# Patient Record
Sex: FEMALE | URBAN - METROPOLITAN AREA
[De-identification: names, ages, dates, MRNs, and addresses within clinical notes are randomized per-mention and may not be internally consistent; named-entity substitution may affect disease eponyms.]

---

## 2019-09-12 ENCOUNTER — INPATIENT (INPATIENT)
Facility: HOSPITAL | Age: 64
LOS: 7 days | Discharge: PSYCHIATRIC OR SUBSTANCE USE FACILITY | DRG: 871 | End: 2019-09-20
Attending: INTERNAL MEDICINE | Admitting: HOSPITALIST
Payer: MEDICARE

## 2019-09-12 VITALS
SYSTOLIC BLOOD PRESSURE: 142 MMHG | OXYGEN SATURATION: 96 % | HEART RATE: 114 BPM | DIASTOLIC BLOOD PRESSURE: 85 MMHG | HEIGHT: 65 IN | TEMPERATURE: 100 F | WEIGHT: 145.06 LBS | RESPIRATION RATE: 16 BRPM

## 2019-09-12 DIAGNOSIS — J18.9 PNEUMONIA, UNSPECIFIED ORGANISM: ICD-10-CM

## 2019-09-12 DIAGNOSIS — F20.9 SCHIZOPHRENIA, UNSPECIFIED: ICD-10-CM

## 2019-09-12 DIAGNOSIS — E07.89 OTHER SPECIFIED DISORDERS OF THYROID: ICD-10-CM

## 2019-09-12 DIAGNOSIS — J18.1 LOBAR PNEUMONIA, UNSPECIFIED ORGANISM: ICD-10-CM

## 2019-09-12 LAB
ALBUMIN SERPL ELPH-MCNC: 4 G/DL — SIGNIFICANT CHANGE UP (ref 3.3–5.2)
ALP SERPL-CCNC: 83 U/L — SIGNIFICANT CHANGE UP (ref 40–120)
ALT FLD-CCNC: 26 U/L — SIGNIFICANT CHANGE UP
ANION GAP SERPL CALC-SCNC: 12 MMOL/L — SIGNIFICANT CHANGE UP (ref 5–17)
APTT BLD: 33.5 SEC — SIGNIFICANT CHANGE UP (ref 27.5–36.3)
AST SERPL-CCNC: 22 U/L — SIGNIFICANT CHANGE UP
BASOPHILS # BLD AUTO: 0.03 K/UL — SIGNIFICANT CHANGE UP (ref 0–0.2)
BASOPHILS NFR BLD AUTO: 0.4 % — SIGNIFICANT CHANGE UP (ref 0–2)
BILIRUB SERPL-MCNC: 0.4 MG/DL — SIGNIFICANT CHANGE UP (ref 0.4–2)
BUN SERPL-MCNC: 16 MG/DL — SIGNIFICANT CHANGE UP (ref 8–20)
CALCIUM SERPL-MCNC: 9.6 MG/DL — SIGNIFICANT CHANGE UP (ref 8.6–10.2)
CHLORIDE SERPL-SCNC: 104 MMOL/L — SIGNIFICANT CHANGE UP (ref 98–107)
CO2 SERPL-SCNC: 28 MMOL/L — SIGNIFICANT CHANGE UP (ref 22–29)
CREAT SERPL-MCNC: 0.6 MG/DL — SIGNIFICANT CHANGE UP (ref 0.5–1.3)
EOSINOPHIL # BLD AUTO: 0.16 K/UL — SIGNIFICANT CHANGE UP (ref 0–0.5)
EOSINOPHIL NFR BLD AUTO: 1.9 % — SIGNIFICANT CHANGE UP (ref 0–6)
GLUCOSE SERPL-MCNC: 120 MG/DL — HIGH (ref 70–115)
HCG SERPL-ACNC: <4 MIU/ML — SIGNIFICANT CHANGE UP
HCT VFR BLD CALC: 44.4 % — SIGNIFICANT CHANGE UP (ref 34.5–45)
HGB BLD-MCNC: 14 G/DL — SIGNIFICANT CHANGE UP (ref 11.5–15.5)
IMM GRANULOCYTES NFR BLD AUTO: 0.2 % — SIGNIFICANT CHANGE UP (ref 0–1.5)
INR BLD: 1.12 RATIO — SIGNIFICANT CHANGE UP (ref 0.88–1.16)
LACTATE BLDV-MCNC: 1.4 MMOL/L — SIGNIFICANT CHANGE UP (ref 0.5–2)
LACTATE BLDV-MCNC: 3.7 MMOL/L — HIGH (ref 0.5–2)
LYMPHOCYTES # BLD AUTO: 1.37 K/UL — SIGNIFICANT CHANGE UP (ref 1–3.3)
LYMPHOCYTES # BLD AUTO: 16.3 % — SIGNIFICANT CHANGE UP (ref 13–44)
MCHC RBC-ENTMCNC: 30.4 PG — SIGNIFICANT CHANGE UP (ref 27–34)
MCHC RBC-ENTMCNC: 31.5 GM/DL — LOW (ref 32–36)
MCV RBC AUTO: 96.5 FL — SIGNIFICANT CHANGE UP (ref 80–100)
MONOCYTES # BLD AUTO: 0.95 K/UL — HIGH (ref 0–0.9)
MONOCYTES NFR BLD AUTO: 11.3 % — SIGNIFICANT CHANGE UP (ref 2–14)
NEUTROPHILS # BLD AUTO: 5.87 K/UL — SIGNIFICANT CHANGE UP (ref 1.8–7.4)
NEUTROPHILS NFR BLD AUTO: 69.9 % — SIGNIFICANT CHANGE UP (ref 43–77)
PLATELET # BLD AUTO: 269 K/UL — SIGNIFICANT CHANGE UP (ref 150–400)
POTASSIUM SERPL-MCNC: 4 MMOL/L — SIGNIFICANT CHANGE UP (ref 3.5–5.3)
POTASSIUM SERPL-SCNC: 4 MMOL/L — SIGNIFICANT CHANGE UP (ref 3.5–5.3)
PROT SERPL-MCNC: 6.7 G/DL — SIGNIFICANT CHANGE UP (ref 6.6–8.7)
PROTHROM AB SERPL-ACNC: 12.9 SEC — SIGNIFICANT CHANGE UP (ref 10–12.9)
RBC # BLD: 4.6 M/UL — SIGNIFICANT CHANGE UP (ref 3.8–5.2)
RBC # FLD: 13.9 % — SIGNIFICANT CHANGE UP (ref 10.3–14.5)
SODIUM SERPL-SCNC: 144 MMOL/L — SIGNIFICANT CHANGE UP (ref 135–145)
WBC # BLD: 8.4 K/UL — SIGNIFICANT CHANGE UP (ref 3.8–10.5)
WBC # FLD AUTO: 8.4 K/UL — SIGNIFICANT CHANGE UP (ref 3.8–10.5)

## 2019-09-12 PROCEDURE — 99285 EMERGENCY DEPT VISIT HI MDM: CPT

## 2019-09-12 PROCEDURE — 70450 CT HEAD/BRAIN W/O DYE: CPT | Mod: 26

## 2019-09-12 PROCEDURE — 71045 X-RAY EXAM CHEST 1 VIEW: CPT | Mod: 26

## 2019-09-12 PROCEDURE — 93010 ELECTROCARDIOGRAM REPORT: CPT

## 2019-09-12 PROCEDURE — 71250 CT THORAX DX C-: CPT | Mod: 26

## 2019-09-12 RX ORDER — IPRATROPIUM/ALBUTEROL SULFATE 18-103MCG
3 AEROSOL WITH ADAPTER (GRAM) INHALATION EVERY 6 HOURS
Refills: 0 | Status: DISCONTINUED | OUTPATIENT
Start: 2019-09-12 | End: 2019-09-20

## 2019-09-12 RX ORDER — CLONAZEPAM 1 MG
1 TABLET ORAL DAILY
Refills: 0 | Status: DISCONTINUED | OUTPATIENT
Start: 2019-09-12 | End: 2019-09-16

## 2019-09-12 RX ORDER — ENOXAPARIN SODIUM 100 MG/ML
40 INJECTION SUBCUTANEOUS DAILY
Refills: 0 | Status: DISCONTINUED | OUTPATIENT
Start: 2019-09-12 | End: 2019-09-20

## 2019-09-12 RX ORDER — ACETAMINOPHEN 500 MG
650 TABLET ORAL ONCE
Refills: 0 | Status: COMPLETED | OUTPATIENT
Start: 2019-09-12 | End: 2019-09-12

## 2019-09-12 RX ORDER — PIPERACILLIN AND TAZOBACTAM 4; .5 G/20ML; G/20ML
3.38 INJECTION, POWDER, LYOPHILIZED, FOR SOLUTION INTRAVENOUS ONCE
Refills: 0 | Status: COMPLETED | OUTPATIENT
Start: 2019-09-12 | End: 2019-09-12

## 2019-09-12 RX ORDER — OLANZAPINE 15 MG/1
10 TABLET, FILM COATED ORAL ONCE
Refills: 0 | Status: COMPLETED | OUTPATIENT
Start: 2019-09-12 | End: 2019-09-12

## 2019-09-12 RX ORDER — ACETAMINOPHEN 500 MG
650 TABLET ORAL EVERY 6 HOURS
Refills: 0 | Status: DISCONTINUED | OUTPATIENT
Start: 2019-09-12 | End: 2019-09-20

## 2019-09-12 RX ORDER — VANCOMYCIN HCL 1 G
1000 VIAL (EA) INTRAVENOUS EVERY 8 HOURS
Refills: 0 | Status: DISCONTINUED | OUTPATIENT
Start: 2019-09-12 | End: 2019-09-13

## 2019-09-12 RX ORDER — SACCHAROMYCES BOULARDII 250 MG
250 POWDER IN PACKET (EA) ORAL
Refills: 0 | Status: DISCONTINUED | OUTPATIENT
Start: 2019-09-12 | End: 2019-09-20

## 2019-09-12 RX ORDER — PIPERACILLIN AND TAZOBACTAM 4; .5 G/20ML; G/20ML
3.38 INJECTION, POWDER, LYOPHILIZED, FOR SOLUTION INTRAVENOUS ONCE
Refills: 0 | Status: COMPLETED | OUTPATIENT
Start: 2019-09-12 | End: 2019-09-13

## 2019-09-12 RX ORDER — LEVOTHYROXINE SODIUM 125 MCG
75 TABLET ORAL DAILY
Refills: 0 | Status: DISCONTINUED | OUTPATIENT
Start: 2019-09-12 | End: 2019-09-20

## 2019-09-12 RX ORDER — DOCUSATE SODIUM 100 MG
100 CAPSULE ORAL
Refills: 0 | Status: DISCONTINUED | OUTPATIENT
Start: 2019-09-12 | End: 2019-09-20

## 2019-09-12 RX ORDER — SIMVASTATIN 20 MG/1
20 TABLET, FILM COATED ORAL AT BEDTIME
Refills: 0 | Status: DISCONTINUED | OUTPATIENT
Start: 2019-09-12 | End: 2019-09-20

## 2019-09-12 RX ORDER — SODIUM CHLORIDE 9 MG/ML
2000 INJECTION INTRAMUSCULAR; INTRAVENOUS; SUBCUTANEOUS ONCE
Refills: 0 | Status: COMPLETED | OUTPATIENT
Start: 2019-09-12 | End: 2019-09-12

## 2019-09-12 RX ORDER — VANCOMYCIN HCL 1 G
1000 VIAL (EA) INTRAVENOUS ONCE
Refills: 0 | Status: COMPLETED | OUTPATIENT
Start: 2019-09-12 | End: 2019-09-12

## 2019-09-12 RX ORDER — VANCOMYCIN HCL 1 G
1000 VIAL (EA) INTRAVENOUS EVERY 12 HOURS
Refills: 0 | Status: DISCONTINUED | OUTPATIENT
Start: 2019-09-12 | End: 2019-09-12

## 2019-09-12 RX ORDER — CEFTRIAXONE 500 MG/1
1000 INJECTION, POWDER, FOR SOLUTION INTRAMUSCULAR; INTRAVENOUS ONCE
Refills: 0 | Status: DISCONTINUED | OUTPATIENT
Start: 2019-09-12 | End: 2019-09-12

## 2019-09-12 RX ORDER — DIVALPROEX SODIUM 500 MG/1
1250 TABLET, DELAYED RELEASE ORAL DAILY
Refills: 0 | Status: DISCONTINUED | OUTPATIENT
Start: 2019-09-12 | End: 2019-09-16

## 2019-09-12 RX ADMIN — PIPERACILLIN AND TAZOBACTAM 200 GRAM(S): 4; .5 INJECTION, POWDER, LYOPHILIZED, FOR SOLUTION INTRAVENOUS at 17:06

## 2019-09-12 RX ADMIN — SODIUM CHLORIDE 2000 MILLILITER(S): 9 INJECTION INTRAMUSCULAR; INTRAVENOUS; SUBCUTANEOUS at 16:55

## 2019-09-12 RX ADMIN — OLANZAPINE 10 MILLIGRAM(S): 15 TABLET, FILM COATED ORAL at 16:56

## 2019-09-12 RX ADMIN — Medication 250 MILLIGRAM(S): at 17:47

## 2019-09-12 RX ADMIN — Medication 650 MILLIGRAM(S): at 22:58

## 2019-09-12 NOTE — ED PROVIDER NOTE - CLINICAL SUMMARY MEDICAL DECISION MAKING FREE TEXT BOX
Pt sent from [place] for fever/ AMS:  Pt alert/ oriented, conversational ; denying any acute complaints;  Negative fever MD but received Tylenol today? (time not noted per chart) to [facility]: Plan to check labs, CTh, CXR, reevaluate

## 2019-09-12 NOTE — ED ADULT NURSE NOTE - OBJECTIVE STATEMENT
Pt sent from [place] for temp and altered mental status.  [place] aids state pt is much more lethargic than usual, pt agitated but a&ox3, no acute s/s of respiratory distress noted or reported at this time, will continue to monitor

## 2019-09-12 NOTE — ED ADULT TRIAGE NOTE - CHIEF COMPLAINT QUOTE
Pt sent in from facility for AMS and fever that started this morning. Had a temp of 101.7 and was given Tylenol 650mg PTA.

## 2019-09-12 NOTE — H&P ADULT - HISTORY OF PRESENT ILLNESS
pt. was sent in from her facility for fever. pt. does not give history. As per record pt. was somewhat lethargic and less active , usually more active at baseline. Pt. was not coughing at her facility as per aide at bedside. Aide also reported that pt. has no swallowing difficulty and is on regular diet. no abd. pain. no n/v/d per history. pt. was sent in from her psych facility for fever. pt. not giving any history. pt. was asked if anything bothering she said  " NO " after that she not answering any questions. As per record pt. was somewhat lethargic and less active , usually more active at baseline. Pt. was not coughing at her facility as per aide at bedside. Aide also reported that pt. has no swallowing difficulty and is on regular diet. no abd. pain. no n/v/d per history. pt. was sent in from her psych facility for fever. pt. not giving any history. pt. was asked if anything bothering she said  " NO " after that she not answering any questions. As per record pt. was somewhat lethargic and less active , usually more active at baseline. Pt. was not coughing at her facility as per aide at bedside. Aide also reported that pt. has no swallowing difficulty and is on regular diet. no abd. pain. no n/v/d per history. As per record her IM risperidone is due on 9/18/19.

## 2019-09-12 NOTE — ED PROVIDER NOTE - PHYSICAL EXAMINATION
Patient awake, conversational ;   States her name, state she is 59;  oriented to hospital  Pt denies any pain, including headache, CP/ SOB/ coughing/ abd pain/ dysuria or back pain.

## 2019-09-12 NOTE — H&P ADULT - NSHPPHYSICALEXAM_GEN_ALL_CORE
General: Pt. lying in bed NAD, eyes closed and does not want to answer most of the questions.  HEENT: AT, NC. PERRL. oral mucosa / lips appear dry.   Neck: supple. no JVD.   Chest: CTA bilaterally  Heart: S1,S2. RRR. no heart murmur. no edema.   Abdomen: soft. non-distended. + BS. pt. not in pain on abd. palpation.   Ext: no swelling of joints noted. moves all ext.  Neuro: pt. is somewhat sedated got zyprexa in the ER. no focal weakness. not co-operative with exam.  Skin: no rash noted, warm and dry.

## 2019-09-12 NOTE — ED ADULT NURSE REASSESSMENT NOTE - NS ED NURSE REASSESS COMMENT FT1
Pt remains at baseline with [facility] aid at bedside, no acute s/s of respiratory distress noted or reported at this time, will continue to monitor

## 2019-09-12 NOTE — H&P ADULT - NSHPSOCIALHISTORY_GEN_ALL_CORE
no smoking, no etoh. no smoking, no etoh  at this point, prior history unknown as pt. not contributing to history.     PSH : Unknown as pt. does not contribute to history.     FH : unknown as pt. does not contribute to history.

## 2019-09-12 NOTE — ED PROVIDER NOTE - OBJECTIVE STATEMENT
Pt sent from [facility] for somnolence; didn't eat today;  per staff with patient pt was okay yesterday;  aid denies any significant cough/ vomiting/diarrhea/ confusion.  Pt at baseline hyperactive, paces, doesn't sit still often;

## 2019-09-13 LAB
HCT VFR BLD CALC: 39.5 % — SIGNIFICANT CHANGE UP (ref 34.5–45)
HGB BLD-MCNC: 12.9 G/DL — SIGNIFICANT CHANGE UP (ref 11.5–15.5)
LACTATE BLDV-MCNC: 3 MMOL/L — HIGH (ref 0.5–2)
MCHC RBC-ENTMCNC: 30.8 PG — SIGNIFICANT CHANGE UP (ref 27–34)
MCHC RBC-ENTMCNC: 32.7 GM/DL — SIGNIFICANT CHANGE UP (ref 32–36)
MCV RBC AUTO: 94.3 FL — SIGNIFICANT CHANGE UP (ref 80–100)
PLATELET # BLD AUTO: 208 K/UL — SIGNIFICANT CHANGE UP (ref 150–400)
RBC # BLD: 4.19 M/UL — SIGNIFICANT CHANGE UP (ref 3.8–5.2)
RBC # FLD: 14 % — SIGNIFICANT CHANGE UP (ref 10.3–14.5)

## 2019-09-13 PROCEDURE — 99222 1ST HOSP IP/OBS MODERATE 55: CPT

## 2019-09-13 PROCEDURE — 99232 SBSQ HOSP IP/OBS MODERATE 35: CPT

## 2019-09-13 RX ORDER — ACETAMINOPHEN 500 MG
1000 TABLET ORAL ONCE
Refills: 0 | Status: COMPLETED | OUTPATIENT
Start: 2019-09-13 | End: 2019-09-13

## 2019-09-13 RX ORDER — PIPERACILLIN AND TAZOBACTAM 4; .5 G/20ML; G/20ML
3.38 INJECTION, POWDER, LYOPHILIZED, FOR SOLUTION INTRAVENOUS EVERY 8 HOURS
Refills: 0 | Status: COMPLETED | OUTPATIENT
Start: 2019-09-13 | End: 2019-09-18

## 2019-09-13 RX ORDER — VANCOMYCIN HCL 1 G
1000 VIAL (EA) INTRAVENOUS EVERY 12 HOURS
Refills: 0 | Status: DISCONTINUED | OUTPATIENT
Start: 2019-09-13 | End: 2019-09-13

## 2019-09-13 RX ORDER — AZITHROMYCIN 500 MG/1
500 TABLET, FILM COATED ORAL DAILY
Refills: 0 | Status: DISCONTINUED | OUTPATIENT
Start: 2019-09-13 | End: 2019-09-18

## 2019-09-13 RX ORDER — SODIUM CHLORIDE 9 MG/ML
1000 INJECTION INTRAMUSCULAR; INTRAVENOUS; SUBCUTANEOUS ONCE
Refills: 0 | Status: DISCONTINUED | OUTPATIENT
Start: 2019-09-13 | End: 2019-09-13

## 2019-09-13 RX ORDER — SODIUM CHLORIDE 9 MG/ML
2000 INJECTION INTRAMUSCULAR; INTRAVENOUS; SUBCUTANEOUS ONCE
Refills: 0 | Status: COMPLETED | OUTPATIENT
Start: 2019-09-13 | End: 2019-09-13

## 2019-09-13 RX ORDER — INFLUENZA VIRUS VACCINE 15; 15; 15; 15 UG/.5ML; UG/.5ML; UG/.5ML; UG/.5ML
0.5 SUSPENSION INTRAMUSCULAR ONCE
Refills: 0 | Status: DISCONTINUED | OUTPATIENT
Start: 2019-09-13 | End: 2019-09-20

## 2019-09-13 RX ORDER — ACETAMINOPHEN 500 MG
650 TABLET ORAL EVERY 6 HOURS
Refills: 0 | Status: DISCONTINUED | OUTPATIENT
Start: 2019-09-13 | End: 2019-09-20

## 2019-09-13 RX ORDER — KETOROLAC TROMETHAMINE 30 MG/ML
15 SYRINGE (ML) INJECTION ONCE
Refills: 0 | Status: DISCONTINUED | OUTPATIENT
Start: 2019-09-13 | End: 2019-09-13

## 2019-09-13 RX ORDER — PIPERACILLIN AND TAZOBACTAM 4; .5 G/20ML; G/20ML
3.38 INJECTION, POWDER, LYOPHILIZED, FOR SOLUTION INTRAVENOUS ONCE
Refills: 0 | Status: COMPLETED | OUTPATIENT
Start: 2019-09-13 | End: 2019-09-13

## 2019-09-13 RX ADMIN — PIPERACILLIN AND TAZOBACTAM 25 GRAM(S): 4; .5 INJECTION, POWDER, LYOPHILIZED, FOR SOLUTION INTRAVENOUS at 05:46

## 2019-09-13 RX ADMIN — Medication 1 MILLIGRAM(S): at 11:15

## 2019-09-13 RX ADMIN — DIVALPROEX SODIUM 1250 MILLIGRAM(S): 500 TABLET, DELAYED RELEASE ORAL at 11:13

## 2019-09-13 RX ADMIN — Medication 650 MILLIGRAM(S): at 12:50

## 2019-09-13 RX ADMIN — PIPERACILLIN AND TAZOBACTAM 25 GRAM(S): 4; .5 INJECTION, POWDER, LYOPHILIZED, FOR SOLUTION INTRAVENOUS at 13:28

## 2019-09-13 RX ADMIN — Medication 75 MICROGRAM(S): at 05:50

## 2019-09-13 RX ADMIN — Medication 250 MILLIGRAM(S): at 18:27

## 2019-09-13 RX ADMIN — Medication 250 MILLIGRAM(S): at 05:46

## 2019-09-13 RX ADMIN — Medication 1 TABLET(S): at 11:13

## 2019-09-13 RX ADMIN — Medication 400 MILLIGRAM(S): at 06:34

## 2019-09-13 RX ADMIN — Medication 100 MILLIGRAM(S): at 18:27

## 2019-09-13 RX ADMIN — ENOXAPARIN SODIUM 40 MILLIGRAM(S): 100 INJECTION SUBCUTANEOUS at 11:13

## 2019-09-13 RX ADMIN — PIPERACILLIN AND TAZOBACTAM 200 GRAM(S): 4; .5 INJECTION, POWDER, LYOPHILIZED, FOR SOLUTION INTRAVENOUS at 22:59

## 2019-09-13 RX ADMIN — Medication 650 MILLIGRAM(S): at 19:56

## 2019-09-13 NOTE — CONSULT NOTE ADULT - SUBJECTIVE AND OBJECTIVE BOX
[facility]  INFECTIOUS DISEASES AND INTERNAL MEDICINE at [place]  =======================================================  Zach Ross MD  Diplomates American Board of Internal Medicine and Infectious Diseases  =======================================================    MRN-[id number]  MAGNO BUCHANAN     CC: fever    HPI: 63y/o woman with PMH of schizophrenia was admitted on 11/12 with fever. She has no complaint today, denies any cough, SOB, abdominal pain or any other symptoms.   She lives a facility where they noticed that she is lethargic and less interactive. Also had fever 102.2 in ED so work up for fever was done, showed RLL consolidation.   Also CT showed mild dilatation in CBD that needs more work up since she is completely asymptomatic.     PAST MEDICAL & SURGICAL HISTORY:  Schizophrenia    Social Hx: no smoking or toxic habits.     FAMILY HISTORY:  Unknown    Allergies  No Known Allergies    Antibiotics:  piperacillin/tazobactam IVPB.. 3.375 Gram(s) IV Intermittent every 8 hours  vancomycin  IVPB 1000 milliGRAM(s) IV Intermittent every 12 hours    REVIEW OF SYSTEMS:  CONSTITUTIONAL:  No Fever or chills  HEENT:  No diplopia or blurred vision.  No sore throat or runny nose.  CARDIOVASCULAR:  No chest pain or SOB.  RESPIRATORY:  No cough, shortness of breath, PND or orthopnea.  GASTROINTESTINAL:  No nausea, vomiting or diarrhea.  GENITOURINARY:  No dysuria, frequency or urgency. No Blood in urine  MUSCULOSKELETAL:  no joint aches, no muscle pain  SKIN:  No change in skin, hair or nails.  NEUROLOGIC:  No paresthesias, fasciculations, seizures or weakness.  PSYCHIATRIC:  No disorder of thought or mood.  ENDOCRINE:  No heat or cold intolerance, polyuria or polydipsia.  HEMATOLOGICAL:  No easy bruising or bleeding.     Physical Exam:  Vital Signs Last 24 Hrs  T(C): 37 (13 Sep 2019 09:15), Max: 39 (12 Sep 2019 21:33)  T(F): 98.6 (13 Sep 2019 09:15), Max: 102.2 (12 Sep 2019 21:33)  HR: 100 (13 Sep 2019 09:15) (96 - 100)  BP: 109/68 (13 Sep 2019 09:15) (109/68 - 150/84)  BP(mean): --  RR: 18 (13 Sep 2019 09:15) (17 - 18)  SpO2: 100% (13 Sep 2019 09:15) (97% - 100%)  GEN: NAD  HEENT: normocephalic and atraumatic. EOMI. PERRL.    NECK: Supple.  No lymphadenopathy   LUNGS: Clear to auscultation. occasional rhonchi   HEART: Regular rate and rhythm without murmur.  ABDOMEN: Soft, nontender, and nondistended.  Positive bowel sounds.    : No CVA tenderness  EXTREMITIES: Without any cyanosis, clubbing, rash, lesions or edema.  NEUROLOGIC: grossly intact.  PSYCHIATRIC: Appropriate affect .  SKIN: No ulceration or induration present.    Labs:  09-12    144  |  104  |  16.0  ----------------------------<  120<H>  4.0   |  28.0  |  0.60    Ca    9.6      12 Sep 2019 16:36    TPro  6.7  /  Alb  4.0  /  TBili  0.4  /  DBili  x   /  AST  22  /  ALT  26  /  AlkPhos  83  09-12                      14.0   8.40  )-----------( 269      ( 12 Sep 2019 16:36 )             44.4     PT/INR - ( 12 Sep 2019 16:36 )   PT: 12.9 sec;   INR: 1.12 ratio    PTT - ( 12 Sep 2019 16:36 )  PTT:33.5 sec    LIVER FUNCTIONS - ( 12 Sep 2019 16:36 )  Alb: 4.0 g/dL / Pro: 6.7 g/dL / ALK PHOS: 83 U/L / ALT: 26 U/L / AST: 22 U/L / GGT: x           RECENT CULTURES:  Pending     All imaging and other data have been reviewed.  Chest CT 11/13:   Impression: Small pericardial effusion.  1.2 cm nonspecific hypodense lesion in the right lobe of thyroid. Thyroid   ultrasound may be pursued for further evaluation.  Pulmonary consolidation/infiltrate in the right lower lobe; clinical   correlation with pneumonia is recommended. Follow-up chest CT may be   pursued in 4-6 weeks to ensure resolution.  Attention should be directed   to the small left lower lobe lung nodule on the follow-up chest CT to   ensure stability or resolution.  Mild nodular contour of the liver, suggestive of cirrhosis. Mild   dilatation of the common bile duct at 7 mm in caliber.    Assessment and Plan:         - Trend WBC and Tm    Will follow.    Case discussed with

## 2019-09-13 NOTE — PROGRESS NOTE ADULT - SUBJECTIVE AND OBJECTIVE BOX
CC: fever, PNA     INTERVAL HPI/OVERNIGHT EVENTS: seen and examined . still having fever . CBD dilated , will obtain MRCP. also has pericardial effusion will follow up on ECHO.  sitter at bedside .     REVIEW OF SYSTEMS: unable to obtain due to mentation         Vital Signs Last 24 Hrs  T(C): 37 (13 Sep 2019 09:15), Max: 39 (12 Sep 2019 21:33)  T(F): 98.6 (13 Sep 2019 09:15), Max: 102.2 (12 Sep 2019 21:33)  HR: 100 (13 Sep 2019 09:15) (96 - 114)  BP: 109/68 (13 Sep 2019 09:15) (109/68 - 150/84)  BP(mean): --  RR: 18 (13 Sep 2019 09:15) (16 - 18)  SpO2: 100% (13 Sep 2019 09:15) (96% - 100%)    PHYSICAL EXAM:    GENERAL: NAD, resting comfortable in bed , kept eyes closed.   HEENT: NC, AT   CHEST/LUNG: diminished air entry BL.   HEART: S1S2+, Regular rate and rhythm; No murmurs, rubs, or gallops  ABDOMEN: Soft, Nontender, Nondistended; Bowel sounds present  EXTREMITIES:  no pitting edema , pulses palpated BL.    LABS:                        14.0   8.40  )-----------( 269      ( 12 Sep 2019 16:36 )             44.4     09-12    144  |  104  |  16.0  ----------------------------<  120<H>  4.0   |  28.0  |  0.60    Ca    9.6      12 Sep 2019 16:36    TPro  6.7  /  Alb  4.0  /  TBili  0.4  /  DBili  x   /  AST  22  /  ALT  26  /  AlkPhos  83  09-12    PT/INR - ( 12 Sep 2019 16:36 )   PT: 12.9 sec;   INR: 1.12 ratio         PTT - ( 12 Sep 2019 16:36 )  PTT:33.5 sec        MEDICATIONS  (STANDING):  clonazePAM  Tablet 1 milliGRAM(s) Oral daily  diVALproex ER 1250 milliGRAM(s) Oral daily  docusate sodium 100 milliGRAM(s) Oral two times a day  enoxaparin Injectable 40 milliGRAM(s) SubCutaneous daily  levothyroxine 75 MICROGram(s) Oral daily  multivitamin 1 Tablet(s) Oral daily  piperacillin/tazobactam IVPB. 3.375 Gram(s) IV Intermittent once  piperacillin/tazobactam IVPB.. 3.375 Gram(s) IV Intermittent every 8 hours  saccharomyces boulardii 250 milliGRAM(s) Oral two times a day  simvastatin 20 milliGRAM(s) Oral at bedtime  vancomycin  IVPB 1000 milliGRAM(s) IV Intermittent every 8 hours    MEDICATIONS  (PRN):  acetaminophen   Tablet .. 650 milliGRAM(s) Oral every 6 hours PRN Temp greater or equal to 38C (100.4F)  acetaminophen  Suppository .. 650 milliGRAM(s) Rectal every 6 hours PRN Temp greater or equal to 38C (100.4F)  ALBUTerol/ipratropium for Nebulization 3 milliLiter(s) Nebulizer every 6 hours PRN Shortness of Breath and/or Wheezing      RADIOLOGY & ADDITIONAL TESTS:

## 2019-09-13 NOTE — PROVIDER CONTACT NOTE (CRITICAL VALUE NOTIFICATION) - ASSESSMENT
Patient to the ED with cc abdominal pain, nausea, right side facial pain and headache.  Patient states this came on all of a sudden
abs given. fluids are running

## 2019-09-13 NOTE — CHART NOTE - NSCHARTNOTEFT_GEN_A_CORE
PHYSICAL EXAM:    Vital Signs Last 24 Hrs  T(C): 40.8 (13 Sep 2019 22:46), Max: 40.8 (13 Sep 2019 22:46)  T(F): 105.5 (13 Sep 2019 22:46), Max: 105.5 (13 Sep 2019 22:46)  HR: 136 (13 Sep 2019 22:46) (97 - 136)  BP: 153/73 (13 Sep 2019 19:48) (109/68 - 153/73)  BP(mean): --  RR: 18 (13 Sep 2019 13:30) (18 - 18)  SpO2: 95% (13 Sep 2019 22:46) (95% - 100%)    GENERAL: Pt lying on stretcher, incontinent of urine, NAD.  ENMT: PERRL, +EOMI.  NECK: soft, Supple, No JVD,   CHEST/LUNG: Clear to auscultation bilaterally  HEART: S1S2+, Regular rate and rhythm; No murmurs.  ABDOMEN: Soft, Nontender, Nondistended; Bowel sounds present.  MUSCULOSKELETAL: moving extremities x4  SKIN: warm, dry  NEURO: Alert, nonverbal, not following commands, not cooperative with patient care Notified by nursing that temp 105.5 rectal, P130.    Patient on stretcher, incontinent of urine, not cooperating with care, striking out and kicking during care. In no apparent respiratory distress, unable to provide meaningful review of systems and not following commands.      PHYSICAL EXAM:  Vital Signs Last 24 Hrs  T(C): 40.8 (13 Sep 2019 22:46), Max: 40.8 (13 Sep 2019 22:46)  T(F): 105.5 (13 Sep 2019 22:46), Max: 105.5 (13 Sep 2019 22:46)  HR: 117 (14 Sep 2019 00:21) (97 - 136)  BP: 128/67 (14 Sep 2019 00:21) (109/68 - 153/73)  BP(mean): --  RR: 20 (14 Sep 2019 00:21) (18 - 20)  SpO2: 95% (14 Sep 2019 00:21) (95% - 100%)    GENERAL: Pt lying on stretcher, incontinent of urine, NAD.  ENMT: PERRL, +EOMI.  NECK: soft, Supple, No JVD,   CHEST/LUNG: Clear to auscultation bilaterally  HEART: S1S2+, Regular rate and rhythm; No murmurs.  ABDOMEN: Soft, Nontender, Nondistended; Bowel sounds present.  MUSCULOSKELETAL: moving extremities x4  SKIN: warm, dry  NEURO: Alert, nonverbal, not following commands, not cooperative with patient care    MEDICATIONS  (STANDING):  azithromycin   Tablet 500 milliGRAM(s) Oral daily  clonazePAM  Tablet 1 milliGRAM(s) Oral daily  diVALproex ER 1250 milliGRAM(s) Oral daily  docusate sodium 100 milliGRAM(s) Oral two times a day  enoxaparin Injectable 40 milliGRAM(s) SubCutaneous daily  haloperidol    Concentrate 1 milliGRAM(s) Oral once  influenza   Vaccine 0.5 milliLiter(s) IntraMuscular once  levothyroxine 75 MICROGram(s) Oral daily  multivitamin 1 Tablet(s) Oral daily  piperacillin/tazobactam IVPB.. 3.375 Gram(s) IV Intermittent every 8 hours  saccharomyces boulardii 250 milliGRAM(s) Oral two times a day  simvastatin 20 milliGRAM(s) Oral at bedtime  sodium chloride 0.9% Bolus 2000 milliLiter(s) IV Bolus once    MEDICATIONS  (PRN):  acetaminophen   Tablet .. 650 milliGRAM(s) Oral every 6 hours PRN Temp greater or equal to 38C (100.4F)  acetaminophen  Suppository .. 650 milliGRAM(s) Rectal every 6 hours PRN Temp greater or equal to 38C (100.4F)  ALBUTerol/ipratropium for Nebulization 3 milliLiter(s) Nebulizer every 6 hours PRN Shortness of Breath and/or Wheezing    LABS:                        12.9   6.13  )-----------( 208      ( 13 Sep 2019 23:39 )             39.5     09-13    138  |  104  |  16.0  ----------------------------<  258<H>  3.9   |  19.0<L>  |  0.75    Ca    8.4<L>      13 Sep 2019 23:39    TPro  6.7  /  Alb  4.0  /  TBili  0.4  /  DBili  x   /  AST  22  /  ALT  26  /  AlkPhos  83  09-12    PT/INR - ( 12 Sep 2019 16:36 )   PT: 12.9 sec;   INR: 1.12 ratio    PTT - ( 12 Sep 2019 16:36 )  PTT:33.5 sec    Blood Gas Venous - Lactate (09.12.19 @ 16:35)  3.7  Blood Gas Venous - Lactate (09.12.19 @ 23:00) 1.4  Blood Gas Venous - Lactate (09.13.19 @ 23:38) 3.0    BC x2 pending  UA, UC not yet obtained    A/P    Community acquired PNA CT showed RLL  - now w T 105.5 rectal, Lactate 3.0  - reviewed w Dr Jane  - fluid bolus 2000ml NS  - repeat lactate 3h  - will add vanco to Zosyn, Zithro to cover MRSA  - influenza, RSV PCR sent  - please send UA, UC  - BC pending

## 2019-09-13 NOTE — CONSULT NOTE ADULT - ASSESSMENT
65y/o woman with PMH of schizophrenia was admitted on 11/12 with fever. She has no complaint today, denies any cough, SOB, abdominal pain or any other symptoms.   She lives a facility where they noticed that she is lethargic and less interactive. Also had fever 102.2 in ED so work up for fever was done, showed RLL consolidation.   Also CT showed mild dilatation in CBD that needs more work up since she is completely asymptomatic.     Pneumonia   Fever  Cirrhosis   CBD dilatation     - Blood culture x 2   - UA and UC  - Chest CT with RLL opacity   - Abdominal CT or MRCP but clinically asymptomatic   - Agree with zosyn 3.375gm q8h   - Start Azithromycin 500mg daily   - Sent sputum culture if able to collect  - Urinary legionella ag   - Zosyn covers abdominal process as well.  - Can stop vancomycin for now.     Will follow. 65y/o woman with PMH of schizophrenia was admitted on 11/12 with fever. She has no complaint today, denies any cough, SOB, abdominal pain or any other symptoms.   She lives a facility where they noticed that she is lethargic and less interactive. Also had fever 102.2 in ED so work up for fever was done, showed RLL consolidation.   Also CT showed mild dilatation in CBD that needs more work up since she is completely asymptomatic.     Pneumonia   Fever  Cirrhosis   CBD dilatation     - Blood culture x 2   - UA and UC  - Chest CT with RLL opacity   - Abdominal CT or MRCP but clinically asymptomatic   - Sent sputum culture if able to collect  - Urinary legionella ag   - Zosyn covers abdominal process as well.  - Can stop vancomycin for now.   - Cardiology to comment on pericardial effusion.   - Agree with zosyn 3.375gm q8h   - Start Azithromycin 500mg daily     Will follow.

## 2019-09-13 NOTE — PROGRESS NOTE ADULT - ASSESSMENT
1. Community acquired PNA   CT showed RLL.  patient is maintained on vanc , zosyn .  still having temp.  will follow up blood cultures .  requested for infectious disease consultation .     2. Dilated CBD ?  unclear etio.   LFT normal .  will do MRCP.  although abdominal exam is benign .     3. Pericardial effusion   could be over estimated on CT .  will follow up on ECHO.    4. thyroid nodule  follow up outpatient .    5. Lung nodule   will need follow up outpatient .    6. Hypothyroidism   on supplement .    7. DLP  on zocor.    8. Hx of schizophrenia   on mood stabilizers depakote and clonazepam.   c/w one to one sitter .    9. DVT prophylaxis   on lovenox.

## 2019-09-14 LAB
ALBUMIN SERPL ELPH-MCNC: 3 G/DL — LOW (ref 3.3–5.2)
ALP SERPL-CCNC: 67 U/L — SIGNIFICANT CHANGE UP (ref 40–120)
ALT FLD-CCNC: 29 U/L — SIGNIFICANT CHANGE UP
ANION GAP SERPL CALC-SCNC: 12 MMOL/L — SIGNIFICANT CHANGE UP (ref 5–17)
ANION GAP SERPL CALC-SCNC: 13 MMOL/L — SIGNIFICANT CHANGE UP (ref 5–17)
ANION GAP SERPL CALC-SCNC: 15 MMOL/L — SIGNIFICANT CHANGE UP (ref 5–17)
APPEARANCE UR: CLEAR — SIGNIFICANT CHANGE UP
AST SERPL-CCNC: 32 U/L — HIGH
BACTERIA # UR AUTO: NEGATIVE — SIGNIFICANT CHANGE UP
BILIRUB DIRECT SERPL-MCNC: 0 MG/DL — SIGNIFICANT CHANGE UP (ref 0–0.3)
BILIRUB INDIRECT FLD-MCNC: <0.2 MG/DL — SIGNIFICANT CHANGE UP (ref 0.2–1)
BILIRUB SERPL-MCNC: <0.2 MG/DL — LOW (ref 0.4–2)
BILIRUB UR-MCNC: NEGATIVE — SIGNIFICANT CHANGE UP
BUN SERPL-MCNC: 12 MG/DL — SIGNIFICANT CHANGE UP (ref 8–20)
BUN SERPL-MCNC: 13 MG/DL — SIGNIFICANT CHANGE UP (ref 8–20)
BUN SERPL-MCNC: 16 MG/DL — SIGNIFICANT CHANGE UP (ref 8–20)
CALCIUM SERPL-MCNC: 8.1 MG/DL — LOW (ref 8.6–10.2)
CALCIUM SERPL-MCNC: 8.2 MG/DL — LOW (ref 8.6–10.2)
CALCIUM SERPL-MCNC: 8.4 MG/DL — LOW (ref 8.6–10.2)
CHLORIDE SERPL-SCNC: 104 MMOL/L — SIGNIFICANT CHANGE UP (ref 98–107)
CHLORIDE SERPL-SCNC: 104 MMOL/L — SIGNIFICANT CHANGE UP (ref 98–107)
CHLORIDE SERPL-SCNC: 112 MMOL/L — HIGH (ref 98–107)
CO2 SERPL-SCNC: 19 MMOL/L — LOW (ref 22–29)
CO2 SERPL-SCNC: 21 MMOL/L — LOW (ref 22–29)
CO2 SERPL-SCNC: 22 MMOL/L — SIGNIFICANT CHANGE UP (ref 22–29)
COLOR SPEC: YELLOW — SIGNIFICANT CHANGE UP
CREAT SERPL-MCNC: 0.42 MG/DL — LOW (ref 0.5–1.3)
CREAT SERPL-MCNC: 0.53 MG/DL — SIGNIFICANT CHANGE UP (ref 0.5–1.3)
CREAT SERPL-MCNC: 0.75 MG/DL — SIGNIFICANT CHANGE UP (ref 0.5–1.3)
DIFF PNL FLD: ABNORMAL
EPI CELLS # UR: SIGNIFICANT CHANGE UP
GLUCOSE SERPL-MCNC: 108 MG/DL — SIGNIFICANT CHANGE UP (ref 70–115)
GLUCOSE SERPL-MCNC: 178 MG/DL — HIGH (ref 70–115)
GLUCOSE SERPL-MCNC: 258 MG/DL — HIGH (ref 70–115)
GLUCOSE UR QL: 50 MG/DL
KETONES UR-MCNC: NEGATIVE — SIGNIFICANT CHANGE UP
LACTATE BLDV-MCNC: 0.7 MMOL/L — SIGNIFICANT CHANGE UP (ref 0.5–2)
LEUKOCYTE ESTERASE UR-ACNC: ABNORMAL
LYMPHOCYTES # BLD AUTO: 10 % — LOW (ref 13–44)
METAMYELOCYTES # FLD: 1 % — HIGH (ref 0–0)
MONOCYTES NFR BLD AUTO: 4 % — SIGNIFICANT CHANGE UP (ref 2–14)
MYELOCYTES NFR BLD: 1 % — HIGH (ref 0–0)
NEUTROPHILS NFR BLD AUTO: 84 % — HIGH (ref 43–77)
NITRITE UR-MCNC: NEGATIVE — SIGNIFICANT CHANGE UP
PH UR: 6.5 — SIGNIFICANT CHANGE UP (ref 5–8)
PLAT MORPH BLD: NORMAL — SIGNIFICANT CHANGE UP
POTASSIUM SERPL-MCNC: 3.7 MMOL/L — SIGNIFICANT CHANGE UP (ref 3.5–5.3)
POTASSIUM SERPL-MCNC: 3.7 MMOL/L — SIGNIFICANT CHANGE UP (ref 3.5–5.3)
POTASSIUM SERPL-MCNC: 3.9 MMOL/L — SIGNIFICANT CHANGE UP (ref 3.5–5.3)
POTASSIUM SERPL-SCNC: 3.7 MMOL/L — SIGNIFICANT CHANGE UP (ref 3.5–5.3)
POTASSIUM SERPL-SCNC: 3.7 MMOL/L — SIGNIFICANT CHANGE UP (ref 3.5–5.3)
POTASSIUM SERPL-SCNC: 3.9 MMOL/L — SIGNIFICANT CHANGE UP (ref 3.5–5.3)
PROCALCITONIN SERPL-MCNC: 0.2 NG/ML — HIGH (ref 0.02–0.1)
PROCALCITONIN SERPL-MCNC: 0.44 NG/ML — HIGH (ref 0.02–0.1)
PROT SERPL-MCNC: 5.7 G/DL — LOW (ref 6.6–8.7)
PROT UR-MCNC: NEGATIVE MG/DL — SIGNIFICANT CHANGE UP
RBC BLD AUTO: NORMAL — SIGNIFICANT CHANGE UP
RBC CASTS # UR COMP ASSIST: SIGNIFICANT CHANGE UP /HPF (ref 0–4)
SMUDGE CELLS # BLD: PRESENT — SIGNIFICANT CHANGE UP
SODIUM SERPL-SCNC: 137 MMOL/L — SIGNIFICANT CHANGE UP (ref 135–145)
SODIUM SERPL-SCNC: 138 MMOL/L — SIGNIFICANT CHANGE UP (ref 135–145)
SODIUM SERPL-SCNC: 147 MMOL/L — HIGH (ref 135–145)
SP GR SPEC: 1 — LOW (ref 1.01–1.02)
T3 SERPL-MCNC: 86 NG/DL — SIGNIFICANT CHANGE UP (ref 80–200)
T4 AB SER-ACNC: 5.8 UG/DL — SIGNIFICANT CHANGE UP (ref 4.5–12)
TSH SERPL-MCNC: 0.38 UIU/ML — SIGNIFICANT CHANGE UP (ref 0.27–4.2)
UROBILINOGEN FLD QL: NEGATIVE MG/DL — SIGNIFICANT CHANGE UP
VALPROATE SERPL-MCNC: 40.6 UG/ML — LOW (ref 50–100)
VANCOMYCIN TROUGH SERPL-MCNC: <4 UG/ML — LOW (ref 10–20)
WBC # BLD: 6.13 K/UL — SIGNIFICANT CHANGE UP (ref 3.8–10.5)
WBC # FLD AUTO: 6.13 K/UL — SIGNIFICANT CHANGE UP (ref 3.8–10.5)
WBC UR QL: ABNORMAL

## 2019-09-14 PROCEDURE — 99222 1ST HOSP IP/OBS MODERATE 55: CPT

## 2019-09-14 PROCEDURE — 99233 SBSQ HOSP IP/OBS HIGH 50: CPT

## 2019-09-14 PROCEDURE — 99223 1ST HOSP IP/OBS HIGH 75: CPT

## 2019-09-14 RX ORDER — VANCOMYCIN HCL 1 G
1000 VIAL (EA) INTRAVENOUS EVERY 12 HOURS
Refills: 0 | Status: DISCONTINUED | OUTPATIENT
Start: 2019-09-14 | End: 2019-09-14

## 2019-09-14 RX ORDER — KETOROLAC TROMETHAMINE 30 MG/ML
15 SYRINGE (ML) INJECTION ONCE
Refills: 0 | Status: DISCONTINUED | OUTPATIENT
Start: 2019-09-14 | End: 2019-09-14

## 2019-09-14 RX ORDER — HALOPERIDOL DECANOATE 100 MG/ML
0.5 INJECTION INTRAMUSCULAR EVERY 6 HOURS
Refills: 0 | Status: DISCONTINUED | OUTPATIENT
Start: 2019-09-14 | End: 2019-09-16

## 2019-09-14 RX ORDER — SODIUM CHLORIDE 9 MG/ML
1000 INJECTION, SOLUTION INTRAVENOUS
Refills: 0 | Status: DISCONTINUED | OUTPATIENT
Start: 2019-09-14 | End: 2019-09-16

## 2019-09-14 RX ORDER — HALOPERIDOL DECANOATE 100 MG/ML
1 INJECTION INTRAMUSCULAR ONCE
Refills: 0 | Status: DISCONTINUED | OUTPATIENT
Start: 2019-09-14 | End: 2019-09-14

## 2019-09-14 RX ORDER — HALOPERIDOL DECANOATE 100 MG/ML
1 INJECTION INTRAMUSCULAR ONCE
Refills: 0 | Status: COMPLETED | OUTPATIENT
Start: 2019-09-14 | End: 2019-09-14

## 2019-09-14 RX ADMIN — Medication 650 MILLIGRAM(S): at 12:42

## 2019-09-14 RX ADMIN — Medication 15 MILLIGRAM(S): at 00:26

## 2019-09-14 RX ADMIN — PIPERACILLIN AND TAZOBACTAM 25 GRAM(S): 4; .5 INJECTION, POWDER, LYOPHILIZED, FOR SOLUTION INTRAVENOUS at 05:53

## 2019-09-14 RX ADMIN — HALOPERIDOL DECANOATE 1 MILLIGRAM(S): 100 INJECTION INTRAMUSCULAR at 21:42

## 2019-09-14 RX ADMIN — SODIUM CHLORIDE 2000 MILLILITER(S): 9 INJECTION INTRAMUSCULAR; INTRAVENOUS; SUBCUTANEOUS at 01:46

## 2019-09-14 RX ADMIN — HALOPERIDOL DECANOATE 0.5 MILLIGRAM(S): 100 INJECTION INTRAMUSCULAR at 09:35

## 2019-09-14 RX ADMIN — Medication 650 MILLIGRAM(S): at 21:10

## 2019-09-14 RX ADMIN — Medication 650 MILLIGRAM(S): at 16:33

## 2019-09-14 RX ADMIN — Medication 1 MILLIGRAM(S): at 11:30

## 2019-09-14 RX ADMIN — Medication 650 MILLIGRAM(S): at 11:32

## 2019-09-14 RX ADMIN — PIPERACILLIN AND TAZOBACTAM 25 GRAM(S): 4; .5 INJECTION, POWDER, LYOPHILIZED, FOR SOLUTION INTRAVENOUS at 21:43

## 2019-09-14 RX ADMIN — SODIUM CHLORIDE 75 MILLILITER(S): 9 INJECTION, SOLUTION INTRAVENOUS at 21:44

## 2019-09-14 RX ADMIN — Medication 15 MILLIGRAM(S): at 21:09

## 2019-09-14 RX ADMIN — Medication 650 MILLIGRAM(S): at 15:33

## 2019-09-14 RX ADMIN — Medication 1 MILLIGRAM(S): at 18:25

## 2019-09-14 NOTE — BEHAVIORAL HEALTH ASSESSMENT NOTE - HPI (INCLUDE ILLNESS QUALITY, SEVERITY, DURATION, TIMING, CONTEXT, MODIFYING FACTORS, ASSOCIATED SIGNS AND SYMPTOMS)
sent from [facility] due to lethargy and fever admitted w pneumonia on 1 to 1 w [facility] aide due to aggressive behaviors in response to treatment refractory psychosis recently clozapine was stated but due to medical condition [facility] psychiatrist indicated decision to hold titration until medically stable History obtained from chart Patient is hostile, grossly delusional , and verbally abusive Aide reports this as her baseline

## 2019-09-14 NOTE — CONSULT NOTE ADULT - SUBJECTIVE AND OBJECTIVE BOX
[facility] AT [place]                                                                                                 ([facility])                                                                                                 CONSULTATION NOTE       History obtained by: Patient, family and medical record     obtained: No    Primary Cardiologist: Denies    Chief complaint:    Patient is a 64y old  Female who presents with a chief complaint of fever (13 Sep 2019 15:24)      HPI:  pt. was sent in from her psych facility for fever. pt. not giving any history. pt. was asked if anything bothering she said  " NO " after that she not answering any questions. As per record pt. was somewhat lethargic and less active , usually more active at baseline. Pt. was not coughing at her facility as per aide at bedside. Aide also reported that pt. has no swallowing difficulty and is on regular diet. no abd. pain. no n/v/d per history. As per record her IM risperidone is due on 9/18/19. (12 Sep 2019 22:47)    Appreciate above by Dr. Martinez 09/12/19  Tried to interview the patient, but she is refusing to answer questions. States she doesn't need a Cardiologist, and refused any exam.     REVIEW OF SYMPTOMS: Unable to obtain     MEDICATIONS  (STANDING):  azithromycin   Tablet 500 milliGRAM(s) Oral daily  clonazePAM  Tablet 1 milliGRAM(s) Oral daily  diVALproex ER 1250 milliGRAM(s) Oral daily  docusate sodium 100 milliGRAM(s) Oral two times a day  enoxaparin Injectable 40 milliGRAM(s) SubCutaneous daily  influenza   Vaccine 0.5 milliLiter(s) IntraMuscular once  levothyroxine 75 MICROGram(s) Oral daily  multivitamin 1 Tablet(s) Oral daily  piperacillin/tazobactam IVPB.. 3.375 Gram(s) IV Intermittent every 8 hours  saccharomyces boulardii 250 milliGRAM(s) Oral two times a day  simvastatin 20 milliGRAM(s) Oral at bedtime  sodium chloride 0.45%. 1000 milliLiter(s) (75 mL/Hr) IV Continuous <Continuous>    MEDICATIONS  (PRN):  acetaminophen   Tablet .. 650 milliGRAM(s) Oral every 6 hours PRN Temp greater or equal to 38C (100.4F)  acetaminophen  Suppository .. 650 milliGRAM(s) Rectal every 6 hours PRN Temp greater or equal to 38C (100.4F)  ALBUTerol/ipratropium for Nebulization 3 milliLiter(s) Nebulizer every 6 hours PRN Shortness of Breath and/or Wheezing  haloperidol    Injectable 0.5 milliGRAM(s) IntraMuscular every 6 hours PRN Agitation  LORazepam   Injectable 1 milliGRAM(s) IntraMuscular every 6 hours PRN Anxiety    Home Medications:  Colace 100 mg oral capsule: 1 cap(s) orally 2 times a day (12 Sep 2019 23:53)  Depakote  mg oral tablet, extended release:  (12 Sep 2019 23:53)  KlonoPIN 1 mg oral tablet: 1 tab(s) orally once a day (12 Sep 2019 23:53)  levothyroxine 75 mcg (0.075 mg) oral tablet: 1 tab(s) orally once a day (12 Sep 2019 23:53)  Multiple Vitamins oral tablet: 1 tab(s) orally once a day (12 Sep 2019 23:53)  Zocor 20 mg oral tablet: 1 tab(s) orally once a day (at bedtime) (12 Sep 2019 23:53)      PAST MEDICAL & SURGICAL HISTORY:  Schizophrenia      FAMILY HISTORY: Unable to obtain    SOCIAL HISTORY: Lives at psych facility     CIGARETTES:  Unable to obtain    ALCOHOL: Unable to obtain    DRUGS: Unable to obtain     Vital Signs Last 24 Hrs  T(C): 36.9 (14 Sep 2019 07:57), Max: 40.8 (13 Sep 2019 22:46)  T(F): 98.4 (14 Sep 2019 07:57), Max: 105.5 (13 Sep 2019 22:46)  HR: 95 (14 Sep 2019 07:57) (91 - 136)  BP: 107/57 (14 Sep 2019 07:57) (107/57 - 153/73)  RR: 18 (14 Sep 2019 07:57) (18 - 20)  SpO2: 98% (14 Sep 2019 07:57) (95% - 100%)    PHYSICAL EXAM: Unable to perform exam as patient is refusing     INTERPRETATION OF TELEMETRY: Not on telemetry     ECG: NSR, normal ECG    I&O's Detail      LABS:                        12.9   6.13  )-----------( 208      ( 13 Sep 2019 23:39 )             39.5     09-14    147<H>  |  112<H>  |  13.0  ----------------------------<  108  3.7   |  22.0  |  0.42<L>    Ca    8.2<L>      14 Sep 2019 06:52    TPro  6.7  /  Alb  4.0  /  TBili  0.4  /  DBili  x   /  AST  22  /  ALT  26  /  AlkPhos  83  09-12        PT/INR - ( 12 Sep 2019 16:36 )   PT: 12.9 sec;   INR: 1.12 ratio         PTT - ( 12 Sep 2019 16:36 )  PTT:33.5 sec    I&O's Summary      RADIOLOGY & ADDITIONAL STUDIES:  X-ray:    PREVIOUS DIAGNOSTIC TESTING:      ECHO  FINDINGS: Date:                : LVEF=          ; RV function:       ; Valvular abnormalities: Mitral valve:           ;  Aortic valve:              ;Tricuspid valve:         ; Pulmonary pressures:        Pericardium:      STRESS    FINDINGS: Date:            ;      CATHETERIZATION  FINDINGS:  Date:              :  LAD:                       ;  LCx:                        ; RCA:                ;     Assesment and Plan:  This is a 64yFemale with history of Pneumonia  Handoff  MEWS Score  Schizophrenia  No pertinent past medical history  Pneumonia  Thyroid lesion  Schizophrenia, unspecified type  Pneumonia of right lower lobe due to infectious organism  HIGH TEMP, AMS   presents with Patient is a 64y old  Female who presents with a chief complaint of fever (13 Sep 2019 15:24)    1) [facility] AT [place]                                                                                                 ([facility])                                                                                                 CONSULTATION NOTE       History obtained by: Patient, family and medical record     obtained: No    Primary Cardiologist: Denies    Chief complaint:    Patient is a 64y old  Female who presents with a chief complaint of fever (13 Sep 2019 15:24)      HPI:  pt. was sent in from her psych facility for fever. pt. not giving any history. pt. was asked if anything bothering she said  " NO " after that she not answering any questions. As per record pt. was somewhat lethargic and less active , usually more active at baseline. Pt. was not coughing at her facility as per aide at bedside. Aide also reported that pt. has no swallowing difficulty and is on regular diet. no abd. pain. no n/v/d per history. As per record her IM risperidone is due on 9/18/19. (12 Sep 2019 22:47)    Appreciate above by Dr. Martinez 09/12/19  Tried to interview the patient, but she is refusing to answer questions. States she doesn't need a Cardiologist, and refused any exam.     REVIEW OF SYMPTOMS: Unable to obtain     MEDICATIONS  (STANDING):  azithromycin   Tablet 500 milliGRAM(s) Oral daily  clonazePAM  Tablet 1 milliGRAM(s) Oral daily  diVALproex ER 1250 milliGRAM(s) Oral daily  docusate sodium 100 milliGRAM(s) Oral two times a day  enoxaparin Injectable 40 milliGRAM(s) SubCutaneous daily  influenza   Vaccine 0.5 milliLiter(s) IntraMuscular once  levothyroxine 75 MICROGram(s) Oral daily  multivitamin 1 Tablet(s) Oral daily  piperacillin/tazobactam IVPB.. 3.375 Gram(s) IV Intermittent every 8 hours  saccharomyces boulardii 250 milliGRAM(s) Oral two times a day  simvastatin 20 milliGRAM(s) Oral at bedtime  sodium chloride 0.45%. 1000 milliLiter(s) (75 mL/Hr) IV Continuous <Continuous>    MEDICATIONS  (PRN):  acetaminophen   Tablet .. 650 milliGRAM(s) Oral every 6 hours PRN Temp greater or equal to 38C (100.4F)  acetaminophen  Suppository .. 650 milliGRAM(s) Rectal every 6 hours PRN Temp greater or equal to 38C (100.4F)  ALBUTerol/ipratropium for Nebulization 3 milliLiter(s) Nebulizer every 6 hours PRN Shortness of Breath and/or Wheezing  haloperidol    Injectable 0.5 milliGRAM(s) IntraMuscular every 6 hours PRN Agitation  LORazepam   Injectable 1 milliGRAM(s) IntraMuscular every 6 hours PRN Anxiety    Home Medications:  Colace 100 mg oral capsule: 1 cap(s) orally 2 times a day (12 Sep 2019 23:53)  Depakote  mg oral tablet, extended release:  (12 Sep 2019 23:53)  KlonoPIN 1 mg oral tablet: 1 tab(s) orally once a day (12 Sep 2019 23:53)  levothyroxine 75 mcg (0.075 mg) oral tablet: 1 tab(s) orally once a day (12 Sep 2019 23:53)  Multiple Vitamins oral tablet: 1 tab(s) orally once a day (12 Sep 2019 23:53)  Zocor 20 mg oral tablet: 1 tab(s) orally once a day (at bedtime) (12 Sep 2019 23:53)      PAST MEDICAL & SURGICAL HISTORY:  Schizophrenia      FAMILY HISTORY: Unable to obtain    SOCIAL HISTORY: Lives at psych facility     CIGARETTES:  Unable to obtain    ALCOHOL: Unable to obtain    DRUGS: Unable to obtain     Vital Signs Last 24 Hrs  T(C): 36.9 (14 Sep 2019 07:57), Max: 40.8 (13 Sep 2019 22:46)  T(F): 98.4 (14 Sep 2019 07:57), Max: 105.5 (13 Sep 2019 22:46)  HR: 95 (14 Sep 2019 07:57) (91 - 136)  BP: 107/57 (14 Sep 2019 07:57) (107/57 - 153/73)  RR: 18 (14 Sep 2019 07:57) (18 - 20)  SpO2: 98% (14 Sep 2019 07:57) (95% - 100%)    PHYSICAL EXAM: Unable to perform exam as patient is refusing     INTERPRETATION OF TELEMETRY: Not on telemetry     ECG: NSR, normal ECG    I&O's Detail      LABS:                        12.9   6.13  )-----------( 208      ( 13 Sep 2019 23:39 )             39.5     09-14    147<H>  |  112<H>  |  13.0  ----------------------------<  108  3.7   |  22.0  |  0.42<L>    Ca    8.2<L>      14 Sep 2019 06:52    TPro  6.7  /  Alb  4.0  /  TBili  0.4  /  DBili  x   /  AST  22  /  ALT  26  /  AlkPhos  83  09-12        PT/INR - ( 12 Sep 2019 16:36 )   PT: 12.9 sec;   INR: 1.12 ratio         PTT - ( 12 Sep 2019 16:36 )  PTT:33.5 sec    I&O's Summary      RADIOLOGY & ADDITIONAL STUDIES:  X-ray:    < from: CT Chest No Cont (09.12.19 @ 20:27) >  INTERPRETATION:  Chest CT without IV contrast    Indication: Fever.    Technique: Axial multidetector CT images of the chest are acquired   without IV contrast.    Comparison: None.    Findings: No pleural effusion. Small pericardial effusion. The cardiac   size is at the upper limits of normal. Aortic and coronary artery   calcifications are present. No aortic aneurysm. 1.2 cm nonspecific   hypodense lesion in the right lobe of thyroid. Thyroid ultrasound may be   pursued for further evaluation. Allowing for the noncontrast technique,   there is no grossly enlarged mediastinal, hilar, or axillary lymph node.    The trachea and central bronchi are grossly patent.    Pulmonary consolidation/infiltrate in the right lower lobe; clinical   correlation with pneumonia is recommended. Mild dependent pleural   parenchymal changes bilaterally. No evidence for pneumothorax. Small   nonspecific 5 mm subpleural left lower lobe lung nodule (image 79 series   3).     Limited sections through the upper abdomen demonstrate few small   hypodense lesions in the liver, difficult to further characterize without   IV contrast. Mild nodular contour of the liver, suggestive of cirrhosis.   Mild dilatation of the common bile duct at 7 mm in caliber.    Impression: Small pericardial effusion.    1.2 cm nonspecific hypodense lesion in the right lobe of thyroid. Thyroid   ultrasound may be pursued for further evaluation.    Pulmonary consolidation/infiltrate in the right lower lobe; clinical   correlation with pneumonia is recommended. Follow-up chest CT may be   pursued in 4-6 weeks to ensure resolution.  Attention should be directed   to the small left lower lobe lung nodule on the follow-up chest CT to   ensure stability or resolution.    Mild nodular contour of the liver, suggestive of cirrhosis. Mild   dilatation of the common bile duct at 7 mm in caliber.      < end of copied text >

## 2019-09-14 NOTE — CONSULT NOTE ADULT - ATTENDING COMMENTS
I examined the pt.    Vitals:  T(C): 36.9 (14 Sep 2019 07:57), Max: 40.8 (13 Sep 2019 22:46)  T(F): 98.4 (14 Sep 2019 07:57), Max: 105.5 (13 Sep 2019 22:46)  HR: 95 (14 Sep 2019 07:57) (91 - 136)  BP: 107/57 (14 Sep 2019 07:57) (107/57 - 153/73)  RR: 18 (14 Sep 2019 07:57) (18 - 20)  SpO2: 98% (14 Sep 2019 07:57) (95% - 100%)    Subjective:  NAD          PHYSICAL EXAM:  Appearance: Normal	  HEENT:   Atraumatic  Cardiovascular: Normal S1 S2, No JVD, No murmurs, No edema  Respiratory: Lungs clear to auscultation	  Gastrointestinal:  Soft, Non-tender, + BS	  Skin: No rashes, No ecchymoses, No cyanosis  Neurologic: Alert and awake, able to move extremities  Extremities: No edema

## 2019-09-14 NOTE — BEHAVIORAL HEALTH ASSESSMENT NOTE - NSBHCHARTREVIEWVS_PSY_A_CORE FT
Vital Signs Last 24 Hrs  T(C): 36.9 (14 Sep 2019 07:57), Max: 40.8 (13 Sep 2019 22:46)  T(F): 98.4 (14 Sep 2019 07:57), Max: 105.5 (13 Sep 2019 22:46)  HR: 95 (14 Sep 2019 07:57) (91 - 136)  BP: 107/57 (14 Sep 2019 07:57) (107/57 - 153/73)  BP(mean): --  RR: 18 (14 Sep 2019 07:57) (18 - 20)  SpO2: 98% (14 Sep 2019 07:57) (95% - 100%)

## 2019-09-14 NOTE — BEHAVIORAL HEALTH ASSESSMENT NOTE - NSBHCHARTREVIEWINVESTIGATE_PSY_A_CORE FT
< from: 12 Lead ECG (09.12.19 @ 17:21) >    QTC Calculation(Bezet) 467 ms    P Axis 32 degrees    R Axis 76 degrees    T Axis 61 degrees    Diagnosis Line Normal sinus rhythm  WNL    < end of copied text >

## 2019-09-14 NOTE — BEHAVIORAL HEALTH ASSESSMENT NOTE - NSBHCHARTREVIEWLAB_PSY_A_CORE FT
12.9   6.13  )-----------( 208      ( 13 Sep 2019 23:39 )             39.5     09-14    147<H>  |  112<H>  |  13.0  ----------------------------<  108  3.7   |  22.0  |  0.42<L>    Ca    8.2<L>      14 Sep 2019 06:52    TPro  6.7  /  Alb  4.0  /  TBili  0.4  /  DBili  x   /  AST  22  /  ALT  26  /  AlkPhos  83  09-12    LIVER FUNCTIONS - ( 12 Sep 2019 16:36 )  Alb: 4.0 g/dL / Pro: 6.7 g/dL / ALK PHOS: 83 U/L / ALT: 26 U/L / AST: 22 U/L / GGT: x           PT/INR - ( 12 Sep 2019 16:36 )   PT: 12.9 sec;   INR: 1.12 ratio         PTT - ( 12 Sep 2019 16:36 )  PTT:33.5 sec

## 2019-09-14 NOTE — BEHAVIORAL HEALTH ASSESSMENT NOTE - NSBHCHARTREVIEWIMAGING_PSY_A_CORE FT
< from: CT Head No Cont (09.12.19 @ 20:26) >    IMPRESSION:     No acute intracranial hemorrhage, mass effect, or evidence of acute   vascular territorial infarction.    If clinical symptoms persist or worsen, more sensitive evaluation with   brain MRI may be obtained, if no contraindications exist.    < end of copied text >

## 2019-09-14 NOTE — CONSULT NOTE ADULT - ASSESSMENT
A/P: pt. was sent in from her psych facility for fever. pt. not giving any history. pt. was asked if anything bothering she said  " NO " after that she not answering any questions. As per record pt. was somewhat lethargic and less active , usually more active at baseline. Pt. was not coughing at her facility as per aide at bedside. Aide also reported that pt. has no swallowing difficulty and is on regular diet. no abd. pain. no n/v/d per history. As per record her IM risperidone is due on 9/18/19. (12 Sep 2019 22:47)    Appreciate above by Dr. Martinez 09/12/19  Tried to interview the patient, but she is refusing to answer questions. States she doesn't need a Cardiologist, and refused any exam.     1. Pericardial Effusion  - Small pericardial effusion on CT chest  - TTE pending   - Cultures pending   - Further recommendations pending echo results    2. Pneumonia  - ID following    3. Schizophrenia  - Management per primary team

## 2019-09-14 NOTE — PROGRESS NOTE ADULT - ASSESSMENT
pt. was sent in from [place] for fever. patient baseline a+o x 0-1 , has been admitted to [facility] couple of weeks ago from LTC for worsening  behavior issues .     > RLL pna / Community acquired PNA vs aspiration   -c/w zosyn ,  vanco   -f/u blood cultures .  - ID consult     >Dilated CBD ?/ unclear if present previously   -US abd pending, mrcp ordered/  pending    -LFT normal .    >Pericardial effusion   -f/u ECHO.  -cardio consult     hypernatremia   -0.45 ns   -monitor bmp     > thyroid nodule / Hypothyroidism   -follow up outpatient .  -c/w meds       >. Hx of schizophrenia / possible dementia   -c/w  depakote and clonazepam.   -c/w 1:1   -check Depakote levels     > DVT prophylaxis   -on lovenox. pt. was sent in from [facility] for fever. patient baseline a+o x 0-1 , has been admitted to [facility] couple of weeks ago from LTC for worsening  behavior issues .     > RLL pna / Community acquired PNA vs aspiration   -c/w zosyn ,  vanco   -f/u blood cultures .  - ID consult     >Dilated CBD ?/ unclear if present previously   -US abd pending,   -LFT normal .    >Pericardial effusion   -f/u ECHO.  -cardio consult     hypernatremia   -0.45 ns   -monitor bmp     > thyroid nodule / Hypothyroidism   -follow up outpatient .  -c/w meds       >. Hx of schizophrenia / possible dementia   -c/w  depakote and clonazepam.   -c/w 1:1   -check Depakote levels     > DVT prophylaxis   -on lovenox.

## 2019-09-14 NOTE — PROGRESS NOTE ADULT - SUBJECTIVE AND OBJECTIVE BOX
CC: fever, PNA , agitation     INTERVAL HPI/OVERNIGHT EVENTS: seen and examined . fever this am of 102.9  . also has pericardial effusion will follow up on ECHO.  aid at bedside. remains agitated. as per aid thats patient's baseline since she has been admitted at [facility] couple of weeks ago     REVIEW OF SYSTEMS: unable to obtain due to mentation       Vital Signs Last 24 Hrs  T(C): 39.4 (14 Sep 2019 11:26), Max: 40.8 (13 Sep 2019 22:46)  T(F): 102.9 (14 Sep 2019 11:26), Max: 105.5 (13 Sep 2019 22:46)  HR: 118 (14 Sep 2019 11:26) (91 - 136)  BP: 146/54 (14 Sep 2019 11:26) (107/57 - 153/73)  BP(mean): --  RR: 18 (14 Sep 2019 07:57) (18 - 20)  SpO2: 98% (14 Sep 2019 07:57) (95% - 98%)    PHYSICAL EXAM:  GENERAL: NAD, in bed , kept eyes closed not cooperative with exam   HEENT: NC, AT   CHEST/LUNG: diminished air entry BL.   HEART: S1S2+, Regular rate and rhythm; No murmurs, rubs, or gallops  ABDOMEN: Soft, Nontender, Nondistended; Bowel sounds present  EXTREMITIES:  no pitting edema ,   neuro:  moving all ext                           12.9   6.13  )-----------( 208      ( 13 Sep 2019 23:39 )             39.5   09-14    147<H>  |  112<H>  |  13.0  ----------------------------<  108  3.7   |  22.0  |  0.42<L>    Ca    8.2<L>      14 Sep 2019 06:52    TPro  6.7  /  Alb  4.0  /  TBili  0.4  /  DBili  x   /  AST  22  /  ALT  26  /  AlkPhos  83  09-12

## 2019-09-15 LAB
CULTURE RESULTS: NO GROWTH — SIGNIFICANT CHANGE UP
FLU A RESULT: SIGNIFICANT CHANGE UP
FLU A RESULT: SIGNIFICANT CHANGE UP
FLUAV AG NPH QL: SIGNIFICANT CHANGE UP
FLUBV AG NPH QL: SIGNIFICANT CHANGE UP
HCV AB S/CO SERPL IA: 0.12 S/CO — SIGNIFICANT CHANGE UP (ref 0–0.99)
HCV AB SERPL-IMP: SIGNIFICANT CHANGE UP
RSV RESULT: SIGNIFICANT CHANGE UP
RSV RNA RESP QL NAA+PROBE: SIGNIFICANT CHANGE UP
SPECIMEN SOURCE: SIGNIFICANT CHANGE UP

## 2019-09-15 PROCEDURE — 99232 SBSQ HOSP IP/OBS MODERATE 35: CPT

## 2019-09-15 PROCEDURE — 99223 1ST HOSP IP/OBS HIGH 75: CPT

## 2019-09-15 RX ORDER — ACETAMINOPHEN 500 MG
1000 TABLET ORAL ONCE
Refills: 0 | Status: COMPLETED | OUTPATIENT
Start: 2019-09-15 | End: 2019-09-15

## 2019-09-15 RX ADMIN — Medication 400 MILLIGRAM(S): at 18:45

## 2019-09-15 RX ADMIN — Medication 250 MILLIGRAM(S): at 17:13

## 2019-09-15 RX ADMIN — HALOPERIDOL DECANOATE 0.5 MILLIGRAM(S): 100 INJECTION INTRAMUSCULAR at 11:19

## 2019-09-15 RX ADMIN — Medication 1 MILLIGRAM(S): at 06:22

## 2019-09-15 RX ADMIN — Medication 1 MILLIGRAM(S): at 00:34

## 2019-09-15 RX ADMIN — PIPERACILLIN AND TAZOBACTAM 25 GRAM(S): 4; .5 INJECTION, POWDER, LYOPHILIZED, FOR SOLUTION INTRAVENOUS at 14:03

## 2019-09-15 RX ADMIN — Medication 100 MILLIGRAM(S): at 17:13

## 2019-09-15 RX ADMIN — Medication 1 MILLIGRAM(S): at 14:04

## 2019-09-15 RX ADMIN — Medication 1000 MILLIGRAM(S): at 19:00

## 2019-09-15 RX ADMIN — Medication 650 MILLIGRAM(S): at 11:13

## 2019-09-15 RX ADMIN — HALOPERIDOL DECANOATE 0.5 MILLIGRAM(S): 100 INJECTION INTRAMUSCULAR at 17:14

## 2019-09-15 RX ADMIN — PIPERACILLIN AND TAZOBACTAM 25 GRAM(S): 4; .5 INJECTION, POWDER, LYOPHILIZED, FOR SOLUTION INTRAVENOUS at 22:13

## 2019-09-15 RX ADMIN — Medication 1 MILLIGRAM(S): at 20:26

## 2019-09-15 RX ADMIN — DIVALPROEX SODIUM 1250 MILLIGRAM(S): 500 TABLET, DELAYED RELEASE ORAL at 17:13

## 2019-09-15 RX ADMIN — PIPERACILLIN AND TAZOBACTAM 25 GRAM(S): 4; .5 INJECTION, POWDER, LYOPHILIZED, FOR SOLUTION INTRAVENOUS at 06:22

## 2019-09-15 RX ADMIN — ENOXAPARIN SODIUM 40 MILLIGRAM(S): 100 INJECTION SUBCUTANEOUS at 14:04

## 2019-09-15 RX ADMIN — SODIUM CHLORIDE 75 MILLILITER(S): 9 INJECTION, SOLUTION INTRAVENOUS at 18:01

## 2019-09-15 RX ADMIN — Medication 650 MILLIGRAM(S): at 10:13

## 2019-09-15 RX ADMIN — Medication 1 TABLET(S): at 14:04

## 2019-09-15 RX ADMIN — AZITHROMYCIN 500 MILLIGRAM(S): 500 TABLET, FILM COATED ORAL at 17:13

## 2019-09-15 NOTE — PROGRESS NOTE ADULT - SUBJECTIVE AND OBJECTIVE BOX
CARDIOLOGY PROGRESS NOTE   ([facility])                                                                                                        Subjective: no new c/o, nad, denied cp    Vitals:  T(C): 38.1 (09-15-19 @ 11:49), Max: 40.1 (09-14-19 @ 15:00)  HR: 102 (09-15-19 @ 11:49) (84 - 114)  BP: 124/73 (09-15-19 @ 10:08) (116/55 - 145/80)  RR: 19 (09-15-19 @ 10:08) (18 - 19)  SpO2: 92% (09-15-19 @ 10:08) (92% - 97%)  Wt(kg): --  I&O's Summary        PHYSICAL EXAM:  Appearance: Normal	  HEENT:   Atraumatic  Cardiovascular: Normal S1 S2, No JVD, No murmurs, No edema  Respiratory: Lungs clear to auscultation	  Gastrointestinal:  Soft, Non-tender, + BS	  Skin: No rashes, No ecchymoses, No cyanosis  Neurologic: Alert and awake, able to move extremities  Extremities: No edema    TELEMETRY: 	          CURRENT MEDICATIONS:    azithromycin   Tablet 500 milliGRAM(s) Oral daily  piperacillin/tazobactam IVPB.. 3.375 Gram(s) IV Intermittent every 8 hours    ALBUTerol/ipratropium for Nebulization 3 milliLiter(s) Nebulizer every 6 hours PRN    acetaminophen   Tablet .. 650 milliGRAM(s) Oral every 6 hours PRN  acetaminophen  Suppository .. 650 milliGRAM(s) Rectal every 6 hours PRN  clonazePAM  Tablet 1 milliGRAM(s) Oral daily  diVALproex ER 1250 milliGRAM(s) Oral daily  haloperidol    Injectable 0.5 milliGRAM(s) IntraMuscular every 6 hours PRN  LORazepam   Injectable 1 milliGRAM(s) IntraMuscular every 6 hours    docusate sodium 100 milliGRAM(s) Oral two times a day    levothyroxine 75 MICROGram(s) Oral daily  simvastatin 20 milliGRAM(s) Oral at bedtime    enoxaparin Injectable 40 milliGRAM(s) SubCutaneous daily  influenza   Vaccine 0.5 milliLiter(s) IntraMuscular once  multivitamin 1 Tablet(s) Oral daily  sodium chloride 0.45%. 1000 milliLiter(s) IV Continuous <Continuous>      LABS:	 	                            12.9   6.13  )-----------( 208      ( 13 Sep 2019 23:39 )             39.5     09-14    137  |  104  |  12.0  ----------------------------<  178<H>  3.7   |  21.0<L>  |  0.53    Ca    8.1<L>      14 Sep 2019 19:11    TPro  5.7<L>  /  Alb  3.0<L>  /  TBili  <0.2<L>  /  DBili  0.0  /  AST  32<H>  /  ALT  29  /  AlkPhos  67  09-14      TSH: Thyroid Stimulating Hormone, Serum: 0.38 uIU/mL (09-13 @ 23:39)

## 2019-09-15 NOTE — PROGRESS NOTE ADULT - SUBJECTIVE AND OBJECTIVE BOX
INFECTIOUS DISEASES AND INTERNAL MEDICINE at [place]  =======================================================  Zach Ross MD  Diplomates American Board of Internal Medicine and Infectious Diseases  Telephone [phone]  Fax            [phone]  =======================================================    MAGNO BUCHANAN [id number]    Follow up: fevers    Allergies:  No Known Allergies      Medications:  acetaminophen   Tablet .. 650 milliGRAM(s) Oral every 6 hours PRN  acetaminophen  Suppository .. 650 milliGRAM(s) Rectal every 6 hours PRN  ALBUTerol/ipratropium for Nebulization 3 milliLiter(s) Nebulizer every 6 hours PRN  azithromycin   Tablet 500 milliGRAM(s) Oral daily  clonazePAM  Tablet 1 milliGRAM(s) Oral daily  diVALproex ER 1250 milliGRAM(s) Oral daily  docusate sodium 100 milliGRAM(s) Oral two times a day  enoxaparin Injectable 40 milliGRAM(s) SubCutaneous daily  haloperidol    Injectable 0.5 milliGRAM(s) IntraMuscular every 6 hours PRN  influenza   Vaccine 0.5 milliLiter(s) IntraMuscular once  levothyroxine 75 MICROGram(s) Oral daily  LORazepam   Injectable 1 milliGRAM(s) IntraMuscular every 6 hours  multivitamin 1 Tablet(s) Oral daily  piperacillin/tazobactam IVPB.. 3.375 Gram(s) IV Intermittent every 8 hours  saccharomyces boulardii 250 milliGRAM(s) Oral two times a day  simvastatin 20 milliGRAM(s) Oral at bedtime  sodium chloride 0.45%. 1000 milliLiter(s) IV Continuous <Continuous>    SOCIAL       FAMILY   FAMILY HISTORY:    REVIEW OF SYSTEMS:  PT POOR HISTORIAN UNALBE TO OBTAIN             Physical Exam:  ICU Vital Signs Last 24 Hrs  T(C): 39.9 (15 Sep 2019 18:29), Max: 39.9 (14 Sep 2019 21:12)  T(F): 103.8 (15 Sep 2019 18:29), Max: 103.8 (14 Sep 2019 21:12)  HR: 71 (15 Sep 2019 18:29) (71 - 114)  BP: 126/54 (15 Sep 2019 18:29) (116/55 - 130/71)  BP(mean): --  ABP: --  ABP(mean): --  RR: 16 (15 Sep 2019 18:29) (16 - 19)  SpO2: 94% (15 Sep 2019 18:29) (92% - 97%)    GEN: NAD,   HEENT: normocephalic and atraumatic. EOMI. MARGARET.    NECK: Supple. No carotid bruits.  No lymphadenopathy or thyromegaly.  LUNGS: Clear to auscultation.  HEART: Regular rate and rhythm without murmur.  ABDOMEN: Soft, nontender, and nondistended.  Positive bowel sounds.    : No CVA tenderness  EXTREMITIES: Without any cyanosis, clubbing, rash, lesions or edema.  MSK: no joint swelling  NEUROLOGIC:  CONFUSED        Labs:      137  |  104  |  12.0  ----------------------------<  178<H>  3.7   |  21.0<L>  |  0.53    Ca    8.1<L>      14 Sep 2019 19:11    TPro  5.7<L>  /  Alb  3.0<L>  /  TBili  <0.2<L>  /  DBili  0.0  /  AST  32<H>  /  ALT  29  /  AlkPhos  67                            12.9   6.13  )-----------( 208      ( 13 Sep 2019 23:39 )             39.5         Urinalysis Basic - ( 14 Sep 2019 14:00 )    Color: Yellow / Appearance: Clear / S.005 / pH: x  Gluc: x / Ketone: Negative  / Bili: Negative / Urobili: Negative mg/dL   Blood: x / Protein: Negative mg/dL / Nitrite: Negative   Leuk Esterase: Small / RBC: 0-2 /HPF / WBC 6-10   Sq Epi: x / Non Sq Epi: Occasional / Bacteria: Negative      LIVER FUNCTIONS - ( 14 Sep 2019 19:11 )  Alb: 3.0 g/dL / Pro: 5.7 g/dL / ALK PHOS: 67 U/L / ALT: 29 U/L / AST: 32 U/L / GGT: x               CAPILLARY BLOOD GLUCOSE            RECENT CULTURES:   @ 14:01 .Urine     No growth         @ 16:39 .Blood     No growth at 48 hours

## 2019-09-15 NOTE — PROGRESS NOTE ADULT - ASSESSMENT
65y/o woman with PMH of schizophrenia was admitted on 11/12 with fever. She has no complaint today, denies any cough, SOB, abdominal pain or any other symptoms.   She lives a facility where they noticed that she is lethargic and less interactive. Also had fever 102.2 in ED so work up for fever was done, showed RLL consolidation.   Also CT showed mild dilatation in CBD that needs more work up since she is completely asymptomatic.     Pneumonia   Fever  Cirrhosis   CBD dilatation     - Blood culture x 2   - UA and UC  - Chest CT with RLL opacity   -     -  ON zosyn 3.375gm q8h   AND Azithromycin 500mg daily     WILL FOLLOW UP WITH FURTHER RECOMMENDATIONS

## 2019-09-15 NOTE — PROGRESS NOTE ADULT - SUBJECTIVE AND OBJECTIVE BOX
being treated for pneumonia w IV antibiotics patient is now asleep Refused PO meds Aide at bedside reports delusional statements and agitation No response to reality orientation  Management was discussed yesterday with Dr Beal who is using lorazepam IM to sedate patient for necessary medical treatment   ROS : not obtained  Vital Signs Last 24 Hrs  T(C): 38.7 (15 Sep 2019 10:08), Max: 40.1 (14 Sep 2019 15:00)  T(F): 101.7 (15 Sep 2019 10:08), Max: 104.1 (14 Sep 2019 15:00)  HR: 109 (15 Sep 2019 10:08) (84 - 118)  BP: 124/73 (15 Sep 2019 10:08) (116/55 - 146/54)  BP(mean): --  RR: 19 (15 Sep 2019 10:08) (18 - 19)  SpO2: 92% (15 Sep 2019 10:08) (92% - 97%)                        12.9   6.13  )-----------( 208      ( 13 Sep 2019 23:39 )             39.5     09-14    137  |  104  |  12.0  ----------------------------<  178<H>  3.7   |  21.0<L>  |  0.53    Ca    8.1<L>      14 Sep 2019 19:11    TPro  5.7<L>  /  Alb  3.0<L>  /  TBili  <0.2<L>  /  DBili  0.0  /  AST  32<H>  /  ALT  29  /  AlkPhos  67  09-14    LIVER FUNCTIONS - ( 14 Sep 2019 19:11 )  Alb: 3.0 g/dL / Pro: 5.7 g/dL / ALK PHOS: 67 U/L / ALT: 29 U/L / AST: 32 U/L / GGT: x             Urinalysis Basic - ( 14 Sep 2019 14:00 )    Color: Yellow / Appearance: Clear / S.005 / pH: x  Gluc: x / Ketone: Negative  / Bili: Negative / Urobili: Negative mg/dL   Blood: x / Protein: Negative mg/dL / Nitrite: Negative     Leuk Esterase: Small / RBC: 0-2 /HPF / WBC 6-10   Sq Epi: x / Non Sq Epi: Occasional / Bacteria: Negative      MEDICATIONS  (STANDING):  azithromycin   Tablet 500 milliGRAM(s) Oral daily  clonazepam  Tablet 1 milliGRAM(s) Oral daily  divalproex ER 1250 milliGRAM(s) Oral daily  docusate sodium 100 milliGRAM(s) Oral two times a day  enoxaparin Injectable 40 milliGRAM(s) SubCutaneous daily  influenza   Vaccine 0.5 milliLiter(s) IntraMuscular once  levothyroxine 75 MICROGram(s) Oral daily  lorazepam   Injectable 1 milliGRAM(s) IntraMuscular every 6 hours  multivitamin 1 Tablet(s) Oral daily  piperacillin/tazobactam IVPB.. 3.375 Gram(s) IV Intermittent every 8 hours  saccharomyces boulardii 250 milliGRAM(s) Oral two times a day  simvastatin 20 milliGRAM(s) Oral at bedtime  sodium chloride 0.45%. 1000 milliLiter(s) (75 mL/Hr) IV Continuous <Continuous>

## 2019-09-15 NOTE — PROGRESS NOTE ADULT - ASSESSMENT
A/P: pt. was sent in from her psych facility for fever. pt. not giving any history. pt. was asked if anything bothering she said  " NO " after that she not answering any questions. As per record pt. was somewhat lethargic and less active , usually more active at baseline. Pt. was not coughing at her facility as per aide at bedside. Aide also reported that pt. has no swallowing difficulty and is on regular diet. no abd. pain. no n/v/d per history. As per record her IM risperidone is due on 9/18/19. (12 Sep 2019 22:47)      Pericardial Effusion  Small pericardial effusion on CT chest  Echo Pendin  Further recommendations pending echo results    Pneumonia  As pe ED

## 2019-09-15 NOTE — PROGRESS NOTE ADULT - ASSESSMENT
pt. was sent in from [facility] for fever. patient baseline a+o x 0-1 , has been admitted to [facility] couple of weeks ago from LTC for worsening  behavior issues .     > RLL pna / Community acquired PNA vs aspiration   -c/w zosyn ,  azithromycin   - vanco dcd    -f/u cultures .  - ID consulted     >Dilated CBD ?/ found on ct chest / unclear if present previously /   -US abd pending,   -LFT normal .    >Pericardial effusion / unclear etiology   -f/u ECHO.  -cardio consult noted and appreciated     >hypernatremia / imrpoved   -monitor bmp     > thyroid nodule / Hypothyroidism   -will follow up outpatient .  -c/w meds       >. Hx of schizophrenia / possible dementia   -c/w  depakote and clonazepam.   -c/w 1:1   -c/w ativan/ haldol      > DVT prophylaxis   -on lovenox.

## 2019-09-15 NOTE — PROGRESS NOTE ADULT - SUBJECTIVE AND OBJECTIVE BOX
CC: fever, PNA , agitation     INTERVAL HPI/OVERNIGHT EVENTS: seen and examined . remains combative and not cooperative   .   aid at bedside. had episode of fever overnight. not following much commands.      REVIEW OF SYSTEMS: unable to obtain due to mentation     Vital Signs Last 24 Hrs  T(C): 38.6 (15 Sep 2019 20:34), Max: 39.9 (15 Sep 2019 18:29)  T(F): 101.4 (15 Sep 2019 20:34), Max: 103.8 (15 Sep 2019 18:29)  HR: 71 (15 Sep 2019 18:29) (71 - 109)  BP: 126/54 (15 Sep 2019 18:29) (116/55 - 128/75)  BP(mean): --  RR: 16 (15 Sep 2019 18:29) (16 - 19)  SpO2: 95% (15 Sep 2019 19:47) (92% - 95%)    PHYSICAL EXAM:  GENERAL: NAD, in bed , kept eyes closed not cooperative with exam   HEENT: NC, AT   CHEST/LUNG: diminished air entry BL.   HEART: S1S2+, Regular rate and rhythm; No murmurs, rubs, or gallops  ABDOMEN: Soft, Nontender, Nondistended; Bowel sounds present  EXTREMITIES:  no pitting edema ,   neuro:  moving all ext                           12.9   6.13  )-----------( 208      ( 13 Sep 2019 23:39 )             39.5   09-14    137  |  104  |  12.0  ----------------------------<  178<H>  3.7   |  21.0<L>  |  0.53    Ca    8.1<L>      14 Sep 2019 19:11    TPro  5.7<L>  /  Alb  3.0<L>  /  TBili  <0.2<L>  /  DBili  0.0  /  AST  32<H>  /  ALT  29  /  AlkPhos  67  09-14

## 2019-09-16 LAB
ALBUMIN SERPL ELPH-MCNC: 2.7 G/DL — LOW (ref 3.3–5.2)
ALP SERPL-CCNC: 58 U/L — SIGNIFICANT CHANGE UP (ref 40–120)
ALT FLD-CCNC: 31 U/L — SIGNIFICANT CHANGE UP
ANION GAP SERPL CALC-SCNC: 11 MMOL/L — SIGNIFICANT CHANGE UP (ref 5–17)
AST SERPL-CCNC: 38 U/L — HIGH
BILIRUB SERPL-MCNC: <0.2 MG/DL — LOW (ref 0.4–2)
BUN SERPL-MCNC: 9 MG/DL — SIGNIFICANT CHANGE UP (ref 8–20)
CALCIUM SERPL-MCNC: 8.3 MG/DL — LOW (ref 8.6–10.2)
CHLORIDE SERPL-SCNC: 105 MMOL/L — SIGNIFICANT CHANGE UP (ref 98–107)
CO2 SERPL-SCNC: 23 MMOL/L — SIGNIFICANT CHANGE UP (ref 22–29)
CREAT SERPL-MCNC: 0.46 MG/DL — LOW (ref 0.5–1.3)
GLUCOSE SERPL-MCNC: 143 MG/DL — HIGH (ref 70–115)
HCT VFR BLD CALC: 33 % — LOW (ref 34.5–45)
HGB BLD-MCNC: 11 G/DL — LOW (ref 11.5–15.5)
LEGIONELLA AG UR QL: NEGATIVE — SIGNIFICANT CHANGE UP
MCHC RBC-ENTMCNC: 30.6 PG — SIGNIFICANT CHANGE UP (ref 27–34)
MCHC RBC-ENTMCNC: 33.3 GM/DL — SIGNIFICANT CHANGE UP (ref 32–36)
MCV RBC AUTO: 91.9 FL — SIGNIFICANT CHANGE UP (ref 80–100)
PLATELET # BLD AUTO: 183 K/UL — SIGNIFICANT CHANGE UP (ref 150–400)
POTASSIUM SERPL-MCNC: 3.7 MMOL/L — SIGNIFICANT CHANGE UP (ref 3.5–5.3)
POTASSIUM SERPL-SCNC: 3.7 MMOL/L — SIGNIFICANT CHANGE UP (ref 3.5–5.3)
PROT SERPL-MCNC: 5.3 G/DL — LOW (ref 6.6–8.7)
RBC # BLD: 3.59 M/UL — LOW (ref 3.8–5.2)
RBC # FLD: 14.3 % — SIGNIFICANT CHANGE UP (ref 10.3–14.5)
SODIUM SERPL-SCNC: 139 MMOL/L — SIGNIFICANT CHANGE UP (ref 135–145)
WBC # BLD: 6.29 K/UL — SIGNIFICANT CHANGE UP (ref 3.8–10.5)
WBC # FLD AUTO: 6.29 K/UL — SIGNIFICANT CHANGE UP (ref 3.8–10.5)

## 2019-09-16 PROCEDURE — 99232 SBSQ HOSP IP/OBS MODERATE 35: CPT

## 2019-09-16 PROCEDURE — 99233 SBSQ HOSP IP/OBS HIGH 50: CPT

## 2019-09-16 RX ORDER — CLONAZEPAM 1 MG
1 TABLET ORAL
Refills: 0 | Status: DISCONTINUED | OUTPATIENT
Start: 2019-09-16 | End: 2019-09-20

## 2019-09-16 RX ORDER — ACETAMINOPHEN 500 MG
1000 TABLET ORAL ONCE
Refills: 0 | Status: COMPLETED | OUTPATIENT
Start: 2019-09-16 | End: 2019-09-16

## 2019-09-16 RX ORDER — VALPROIC ACID (AS SODIUM SALT) 250 MG/5ML
1000 SOLUTION, ORAL ORAL
Refills: 0 | Status: DISCONTINUED | OUTPATIENT
Start: 2019-09-16 | End: 2019-09-20

## 2019-09-16 RX ORDER — CHLORPROMAZINE HCL 10 MG
50 TABLET ORAL ONCE
Refills: 0 | Status: COMPLETED | OUTPATIENT
Start: 2019-09-16 | End: 2019-09-16

## 2019-09-16 RX ORDER — CHLORPROMAZINE HCL 10 MG
25 TABLET ORAL EVERY 6 HOURS
Refills: 0 | Status: DISCONTINUED | OUTPATIENT
Start: 2019-09-16 | End: 2019-09-20

## 2019-09-16 RX ORDER — CHLORPROMAZINE HCL 10 MG
25 TABLET ORAL
Refills: 0 | Status: DISCONTINUED | OUTPATIENT
Start: 2019-09-16 | End: 2019-09-20

## 2019-09-16 RX ADMIN — ENOXAPARIN SODIUM 40 MILLIGRAM(S): 100 INJECTION SUBCUTANEOUS at 17:18

## 2019-09-16 RX ADMIN — Medication 100 MILLIGRAM(S): at 06:30

## 2019-09-16 RX ADMIN — PIPERACILLIN AND TAZOBACTAM 25 GRAM(S): 4; .5 INJECTION, POWDER, LYOPHILIZED, FOR SOLUTION INTRAVENOUS at 06:30

## 2019-09-16 RX ADMIN — PIPERACILLIN AND TAZOBACTAM 25 GRAM(S): 4; .5 INJECTION, POWDER, LYOPHILIZED, FOR SOLUTION INTRAVENOUS at 22:07

## 2019-09-16 RX ADMIN — Medication 650 MILLIGRAM(S): at 17:17

## 2019-09-16 RX ADMIN — SIMVASTATIN 20 MILLIGRAM(S): 20 TABLET, FILM COATED ORAL at 22:23

## 2019-09-16 RX ADMIN — Medication 25 MILLIGRAM(S): at 17:28

## 2019-09-16 RX ADMIN — Medication 25 MILLIGRAM(S): at 17:17

## 2019-09-16 RX ADMIN — SODIUM CHLORIDE 75 MILLILITER(S): 9 INJECTION, SOLUTION INTRAVENOUS at 07:39

## 2019-09-16 RX ADMIN — Medication 250 MILLIGRAM(S): at 17:17

## 2019-09-16 RX ADMIN — AZITHROMYCIN 500 MILLIGRAM(S): 500 TABLET, FILM COATED ORAL at 17:17

## 2019-09-16 RX ADMIN — Medication 400 MILLIGRAM(S): at 10:54

## 2019-09-16 RX ADMIN — Medication 1000 MILLIGRAM(S): at 11:04

## 2019-09-16 RX ADMIN — Medication 1000 MILLIGRAM(S): at 17:17

## 2019-09-16 RX ADMIN — Medication 100 MILLIGRAM(S): at 17:17

## 2019-09-16 RX ADMIN — Medication 75 MICROGRAM(S): at 06:30

## 2019-09-16 RX ADMIN — Medication 25 MILLIGRAM(S): at 10:11

## 2019-09-16 RX ADMIN — Medication 1 MILLIGRAM(S): at 17:20

## 2019-09-16 RX ADMIN — Medication 1 MILLIGRAM(S): at 02:10

## 2019-09-16 RX ADMIN — Medication 650 MILLIGRAM(S): at 00:00

## 2019-09-16 RX ADMIN — PIPERACILLIN AND TAZOBACTAM 25 GRAM(S): 4; .5 INJECTION, POWDER, LYOPHILIZED, FOR SOLUTION INTRAVENOUS at 14:36

## 2019-09-16 RX ADMIN — HALOPERIDOL DECANOATE 0.5 MILLIGRAM(S): 100 INJECTION INTRAMUSCULAR at 00:48

## 2019-09-16 RX ADMIN — Medication 250 MILLIGRAM(S): at 06:30

## 2019-09-16 RX ADMIN — Medication 1 MILLIGRAM(S): at 07:38

## 2019-09-16 NOTE — PROGRESS NOTE ADULT - ASSESSMENT
pt. was sent in from [facility] for fever. patient baseline a+o x 0-1 , has been admitted to [facility] couple of weeks ago from LTC for worsening  behavior issues .     > RLL pna / Community acquired PNA vs aspiration   -c/w zosyn ,  azithromycin   - vanco dcd    -f/u cultures .  - ID consulted     >Dilated CBD ?/ found on ct chest / unclear if present previously /   -US abd pending,   -LFT normal , trend     >Pericardial effusion / unclear etiology   -f/u ECHO.  -cardio consult noted and appreciated     >hypernatremia / imrpoved   -monitor bmp     > thyroid nodule / Hypothyroidism   -will follow up outpatient .  -c/w meds       >. Hx of schizophrenia / possible dementia   -c/w  depakote and clonazepam.   -c/w 1:1   -c/w ativan/ haldol    -thorazine added as per psych     > DVT prophylaxis   -on lovenox.       >dispo: will have sw contact [facility] to get info about patient's next of kin

## 2019-09-16 NOTE — PROGRESS NOTE ADULT - SUBJECTIVE AND OBJECTIVE BOX
[facility]  INFECTIOUS DISEASES AND INTERNAL MEDICINE at [place]  =======================================================  Zach Ross MD  Diplomates American Board of Internal Medicine and Infectious Diseases  =======================================================    [id number]  MAGNO BUCHANAN     Follow up: Fever and pneumonia     Looks comfortable. No fever, has cough as per 1:1 aide.     PAST MEDICAL & SURGICAL HISTORY:  Schizophrenia    Social Hx: no smoking or toxic habits.     FAMILY HISTORY:  Unknown    Allergies  No Known Allergies    Antibiotics:  piperacillin/tazobactam   azithromycin     REVIEW OF SYSTEMS:  CONSTITUTIONAL:  No Fever or chills  HEENT:  No diplopia or blurred vision.  No sore throat or runny nose.  CARDIOVASCULAR:  No chest pain or SOB.  RESPIRATORY:  +cough, no shortness of breath, PND or orthopnea.  GASTROINTESTINAL:  No nausea, vomiting or diarrhea.  GENITOURINARY:  No dysuria, frequency or urgency. No Blood in urine  MUSCULOSKELETAL:  no joint aches, no muscle pain  SKIN:  No change in skin, hair or nails.  NEUROLOGIC:  No paresthesias, fasciculations, seizures or weakness.  PSYCHIATRIC:  No disorder of thought or mood.  ENDOCRINE:  No heat or cold intolerance, polyuria or polydipsia.  HEMATOLOGICAL:  No easy bruising or bleeding.     Physical Exam:  Vital Signs Last 24 Hrs  T(C): 38.4 (16 Sep 2019 09:49), Max: 39.9 (15 Sep 2019 18:29)  T(F): 101.2 (16 Sep 2019 09:49), Max: 103.8 (15 Sep 2019 18:29)  HR: 112 (16 Sep 2019 09:49) (71 - 112)  BP: 124/69 (16 Sep 2019 09:49) (119/74 - 146/66)  RR: 20 (16 Sep 2019 09:49) (16 - 20)  SpO2: 94% (16 Sep 2019 09:49) (92% - 97%)  GEN: NAD  HEENT: normocephalic and atraumatic. EOMI. PERRL.    NECK: Supple.  No lymphadenopathy   LUNGS: Clear to auscultation. occasional rhonchi   HEART: Regular rate and rhythm without murmur.  ABDOMEN: Soft, nontender, and nondistended.  Positive bowel sounds.    : No CVA tenderness  EXTREMITIES: Without any cyanosis, clubbing, rash, lesions or edema.  NEUROLOGIC: grossly intact.  PSYCHIATRIC: Appropriate affect .  SKIN: No ulceration or induration present.    Labs:      137  |  104  |  12.0  ----------------------------<  178<H>  3.7   |  21.0<L>  |  0.53    Ca    8.1<L>      14 Sep 2019 19:11    TPro  5.7<L>  /  Alb  3.0<L>  /  TBili  <0.2<L>  /  DBili  0.0  /  AST  32<H>  /  ALT  29  /  AlkPhos  67      Urinalysis Basic - ( 14 Sep 2019 14:00 )    Color: Yellow / Appearance: Clear / S.005 / pH: x  Gluc: x / Ketone: Negative  / Bili: Negative / Urobili: Negative mg/dL   Blood: x / Protein: Negative mg/dL / Nitrite: Negative   Leuk Esterase: Small / RBC: 0-2 /HPF / WBC 6-10   Sq Epi: x / Non Sq Epi: Occasional / Bacteria: Negative    LIVER FUNCTIONS - ( 14 Sep 2019 19:11 )  Alb: 3.0 g/dL / Pro: 5.7 g/dL / ALK PHOS: 67 U/L / ALT: 29 U/L / AST: 32 U/L / GGT: x           RECENT CULTURES:   @ 14:01 .Urine     No growth     @ 16:39 .Blood     No growth at 48 hours      All imaging and other data have been reviewed.  Chest CT :   Impression: Small pericardial effusion.  1.2 cm nonspecific hypodense lesion in the right lobe of thyroid. Thyroid   ultrasound may be pursued for further evaluation.  Pulmonary consolidation/infiltrate in the right lower lobe; clinical   correlation with pneumonia is recommended. Follow-up chest CT may be   pursued in 4-6 weeks to ensure resolution.  Attention should be directed   to the small left lower lobe lung nodule on the follow-up chest CT to   ensure stability or resolution.  Mild nodular contour of the liver, suggestive of cirrhosis. Mild   dilatation of the common bile duct at 7 mm in caliber.    Assessment and Plan:   65y/o woman with PMH of schizophrenia was admitted on  with fever. She has no complaint today, denies any cough, SOB, abdominal pain or any other symptoms.   She lives a facility where they noticed that she is lethargic and less interactive. Also had fever 102.2 in ED so work up for fever was done, showed RLL consolidation.   Also CT showed mild dilatation in CBD that needs more work up since she is completely asymptomatic.     Pneumonia   Fever  Cirrhosis   CBD dilatation     - Blood culture negative   - UC neg   - Chest CT with RLL opacity   - Procalcitonin 0.20---0.44  - Urinary legionella ag pending   - Cardiology consult noted for pericardial effusion. Awaiting TTE.   - Abdominal USG to evaluate liver and gall bladder   - Continue with zosyn 3.375gm q8h   - Continue Azithromycin 500mg daily     Will follow. [facility]  INFECTIOUS DISEASES AND INTERNAL MEDICINE at [place]  =======================================================  Zach Ross MD  Diplomates American Board of Internal Medicine and Infectious Diseases  =======================================================    [id number]  MAGNO BUCHANAN     Follow up: Fever and pneumonia     Looks comfortable. High fever, has cough as per 1:1 aide.   Seen by cardiology.     PAST MEDICAL & SURGICAL HISTORY:  Schizophrenia    Social Hx: no smoking or toxic habits.     FAMILY HISTORY:  Unknown    Allergies  No Known Allergies    Antibiotics:  piperacillin/tazobactam   azithromycin     REVIEW OF SYSTEMS:  CONSTITUTIONAL:  No Fever or chills  HEENT:  No diplopia or blurred vision.  No sore throat or runny nose.  CARDIOVASCULAR:  No chest pain or SOB.  RESPIRATORY:  +cough, no shortness of breath, PND or orthopnea.  GASTROINTESTINAL:  No nausea, vomiting or diarrhea.  GENITOURINARY:  No dysuria, frequency or urgency. No Blood in urine  MUSCULOSKELETAL:  no joint aches, no muscle pain  SKIN:  No change in skin, hair or nails.  NEUROLOGIC:  No paresthesias, fasciculations, seizures or weakness.  PSYCHIATRIC:  No disorder of thought or mood.  ENDOCRINE:  No heat or cold intolerance, polyuria or polydipsia.  HEMATOLOGICAL:  No easy bruising or bleeding.     Physical Exam:  Vital Signs Last 24 Hrs  T(C): 38.4 (16 Sep 2019 09:49), Max: 39.9 (15 Sep 2019 18:29)  T(F): 101.2 (16 Sep 2019 09:49), Max: 103.8 (15 Sep 2019 18:29)  HR: 112 (16 Sep 2019 09:49) (71 - 112)  BP: 124/69 (16 Sep 2019 09:49) (119/74 - 146/66)  RR: 20 (16 Sep 2019 09:49) (16 - 20)  SpO2: 94% (16 Sep 2019 09:49) (92% - 97%)  GEN: NAD  HEENT: normocephalic and atraumatic. EOMI. PERRL.    NECK: Supple.  No lymphadenopathy   LUNGS: Clear to auscultation. occasional rhonchi   HEART: Regular rate and rhythm without murmur.  ABDOMEN: Soft, nontender, and nondistended.  Positive bowel sounds.    : No CVA tenderness  EXTREMITIES: Without any cyanosis, clubbing, rash, lesions or edema.  NEUROLOGIC: grossly intact.  PSYCHIATRIC: Appropriate affect .  SKIN: No ulceration or induration present.    Labs:      137  |  104  |  12.0  ----------------------------<  178<H>  3.7   |  21.0<L>  |  0.53    Ca    8.1<L>      14 Sep 2019 19:11    TPro  5.7<L>  /  Alb  3.0<L>  /  TBili  <0.2<L>  /  DBili  0.0  /  AST  32<H>  /  ALT  29  /  AlkPhos  67      Urinalysis Basic - ( 14 Sep 2019 14:00 )    Color: Yellow / Appearance: Clear / S.005 / pH: x  Gluc: x / Ketone: Negative  / Bili: Negative / Urobili: Negative mg/dL   Blood: x / Protein: Negative mg/dL / Nitrite: Negative   Leuk Esterase: Small / RBC: 0-2 /HPF / WBC 6-10   Sq Epi: x / Non Sq Epi: Occasional / Bacteria: Negative    LIVER FUNCTIONS - ( 14 Sep 2019 19:11 )  Alb: 3.0 g/dL / Pro: 5.7 g/dL / ALK PHOS: 67 U/L / ALT: 29 U/L / AST: 32 U/L / GGT: x           RECENT CULTURES:   @ 14:01 .Urine     No growth     @ 16:39 .Blood     No growth at 48 hours      All imaging and other data have been reviewed.  Chest CT :   Impression: Small pericardial effusion.  1.2 cm nonspecific hypodense lesion in the right lobe of thyroid. Thyroid   ultrasound may be pursued for further evaluation.  Pulmonary consolidation/infiltrate in the right lower lobe; clinical   correlation with pneumonia is recommended. Follow-up chest CT may be   pursued in 4-6 weeks to ensure resolution.  Attention should be directed   to the small left lower lobe lung nodule on the follow-up chest CT to   ensure stability or resolution.  Mild nodular contour of the liver, suggestive of cirrhosis. Mild   dilatation of the common bile duct at 7 mm in caliber.    Assessment and Plan:   63y/o woman with PMH of schizophrenia was admitted on  with fever. She has no complaint today, denies any cough, SOB, abdominal pain or any other symptoms.   She lives a facility where they noticed that she is lethargic and less interactive. Also had fever 102.2 in ED so work up for fever was done, showed RLL consolidation.   Also CT showed mild dilatation in CBD that needs more work up since she is completely asymptomatic.     Pneumonia   Fever  Cirrhosis   CBD dilatation     - Blood culture negative   - UC neg   - Chest CT with RLL opacity   - Procalcitonin 0.20---0.44  - Urinary legionella ag pending   - Cardiology consult noted for pericardial effusion. Awaiting TTE.   - Abdominal USG to evaluate liver and gall bladder, for dilated CBD  - Continue with zosyn 3.375gm q8h   - Continue Azithromycin 500mg daily     Will follow.

## 2019-09-16 NOTE — PROVIDER CONTACT NOTE (OTHER) - ACTION/TREATMENT ORDERED:
Dr Beal states no need for code sepsis since pt was admitted with temps and increased HR. IV tylenol ordered and given.
md choudhary asked me to call PA to get 1 g IV Tylenol ordered once PA called awaiting orders
Dr Beal aware, she states no need to call code sepsis due to work up was already done for same issues.

## 2019-09-16 NOTE — PROVIDER CONTACT NOTE (OTHER) - SITUATION
101.2 f rectal temp as well as 
Pt has fever 101.7 orally pt will not let me take rectal temp.  Pt tachycardic 109 bpm HR. otherwise pt resting comfortable in bed.
Pt has 103.8 rectal temp all other vitals WNL.

## 2019-09-16 NOTE — PROGRESS NOTE ADULT - SUBJECTIVE AND OBJECTIVE BOX
CC: fever, PNA , agitation     INTERVAL HPI/OVERNIGHT EVENTS: seen and examined . remains combative and not very cooperative, had mild improvement in agitation after receiving Thorazine   aid at bedside. had episode of fever overnight. not following much commands.      Vital Signs Last 24 Hrs  T(C): 37 (16 Sep 2019 13:03), Max: 39.9 (15 Sep 2019 18:29)  T(F): 98.6 (16 Sep 2019 13:03), Max: 103.8 (15 Sep 2019 18:29)  HR: 80 (16 Sep 2019 16:29) (71 - 112)  BP: 120/70 (16 Sep 2019 16:29) (111/62 - 146/66)  BP(mean): --  RR: 20 (16 Sep 2019 16:29) (16 - 20)  SpO2: 95% (16 Sep 2019 16:29) (92% - 97%)    PHYSICAL EXAM:  GENERAL: in bed , kept eyes closed not cooperative with exam   HEENT: NC, AT   CHEST/LUNG: diminished air entry BL.   HEART: S1S2+, Regular rate and rhythm; No murmurs, rubs, or gallops  ABDOMEN: Soft, Nontender, Nondistended; Bowel sounds present  EXTREMITIES:  no pitting edema ,   neuro:  moving all ext     09-14    137  |  104  |  12.0  ----------------------------<  178<H>  3.7   |  21.0<L>  |  0.53    Ca    8.1<L>      14 Sep 2019 19:11    TPro  5.7<L>  /  Alb  3.0<L>  /  TBili  <0.2<L>  /  DBili  0.0  /  AST  32<H>  /  ALT  29  /  AlkPhos  67  09-14

## 2019-09-17 LAB
ALBUMIN SERPL ELPH-MCNC: 2.8 G/DL — LOW (ref 3.3–5.2)
ALP SERPL-CCNC: 55 U/L — SIGNIFICANT CHANGE UP (ref 40–120)
ALT FLD-CCNC: 31 U/L — SIGNIFICANT CHANGE UP
ANION GAP SERPL CALC-SCNC: 11 MMOL/L — SIGNIFICANT CHANGE UP (ref 5–17)
AST SERPL-CCNC: 31 U/L — SIGNIFICANT CHANGE UP
BILIRUB SERPL-MCNC: <0.2 MG/DL — LOW (ref 0.4–2)
BUN SERPL-MCNC: 7 MG/DL — LOW (ref 8–20)
CALCIUM SERPL-MCNC: 8.8 MG/DL — SIGNIFICANT CHANGE UP (ref 8.6–10.2)
CHLORIDE SERPL-SCNC: 106 MMOL/L — SIGNIFICANT CHANGE UP (ref 98–107)
CO2 SERPL-SCNC: 25 MMOL/L — SIGNIFICANT CHANGE UP (ref 22–29)
CREAT SERPL-MCNC: 0.37 MG/DL — LOW (ref 0.5–1.3)
CULTURE RESULTS: SIGNIFICANT CHANGE UP
CULTURE RESULTS: SIGNIFICANT CHANGE UP
GLUCOSE SERPL-MCNC: 109 MG/DL — SIGNIFICANT CHANGE UP (ref 70–115)
HCT VFR BLD CALC: 33.5 % — LOW (ref 34.5–45)
HGB BLD-MCNC: 10.9 G/DL — LOW (ref 11.5–15.5)
MCHC RBC-ENTMCNC: 30.2 PG — SIGNIFICANT CHANGE UP (ref 27–34)
MCHC RBC-ENTMCNC: 32.5 GM/DL — SIGNIFICANT CHANGE UP (ref 32–36)
MCV RBC AUTO: 92.8 FL — SIGNIFICANT CHANGE UP (ref 80–100)
PLATELET # BLD AUTO: 199 K/UL — SIGNIFICANT CHANGE UP (ref 150–400)
POTASSIUM SERPL-MCNC: 3.9 MMOL/L — SIGNIFICANT CHANGE UP (ref 3.5–5.3)
POTASSIUM SERPL-SCNC: 3.9 MMOL/L — SIGNIFICANT CHANGE UP (ref 3.5–5.3)
PROT SERPL-MCNC: 5.5 G/DL — LOW (ref 6.6–8.7)
RBC # BLD: 3.61 M/UL — LOW (ref 3.8–5.2)
RBC # FLD: 14.3 % — SIGNIFICANT CHANGE UP (ref 10.3–14.5)
SODIUM SERPL-SCNC: 142 MMOL/L — SIGNIFICANT CHANGE UP (ref 135–145)
SPECIMEN SOURCE: SIGNIFICANT CHANGE UP
SPECIMEN SOURCE: SIGNIFICANT CHANGE UP
WBC # BLD: 6.79 K/UL — SIGNIFICANT CHANGE UP (ref 3.8–10.5)
WBC # FLD AUTO: 6.79 K/UL — SIGNIFICANT CHANGE UP (ref 3.8–10.5)

## 2019-09-17 PROCEDURE — 99233 SBSQ HOSP IP/OBS HIGH 50: CPT

## 2019-09-17 PROCEDURE — 99232 SBSQ HOSP IP/OBS MODERATE 35: CPT

## 2019-09-17 RX ADMIN — SIMVASTATIN 20 MILLIGRAM(S): 20 TABLET, FILM COATED ORAL at 22:18

## 2019-09-17 RX ADMIN — PIPERACILLIN AND TAZOBACTAM 25 GRAM(S): 4; .5 INJECTION, POWDER, LYOPHILIZED, FOR SOLUTION INTRAVENOUS at 05:46

## 2019-09-17 RX ADMIN — PIPERACILLIN AND TAZOBACTAM 25 GRAM(S): 4; .5 INJECTION, POWDER, LYOPHILIZED, FOR SOLUTION INTRAVENOUS at 22:17

## 2019-09-17 RX ADMIN — Medication 650 MILLIGRAM(S): at 22:55

## 2019-09-17 RX ADMIN — Medication 250 MILLIGRAM(S): at 17:34

## 2019-09-17 RX ADMIN — PIPERACILLIN AND TAZOBACTAM 25 GRAM(S): 4; .5 INJECTION, POWDER, LYOPHILIZED, FOR SOLUTION INTRAVENOUS at 12:47

## 2019-09-17 RX ADMIN — Medication 1 MILLIGRAM(S): at 05:46

## 2019-09-17 RX ADMIN — Medication 250 MILLIGRAM(S): at 05:47

## 2019-09-17 RX ADMIN — Medication 650 MILLIGRAM(S): at 06:00

## 2019-09-17 RX ADMIN — AZITHROMYCIN 500 MILLIGRAM(S): 500 TABLET, FILM COATED ORAL at 12:47

## 2019-09-17 RX ADMIN — Medication 100 MILLIGRAM(S): at 17:34

## 2019-09-17 RX ADMIN — Medication 1 MILLIGRAM(S): at 17:34

## 2019-09-17 RX ADMIN — Medication 25 MILLIGRAM(S): at 17:34

## 2019-09-17 RX ADMIN — Medication 100 MILLIGRAM(S): at 05:53

## 2019-09-17 RX ADMIN — Medication 650 MILLIGRAM(S): at 06:30

## 2019-09-17 RX ADMIN — ENOXAPARIN SODIUM 40 MILLIGRAM(S): 100 INJECTION SUBCUTANEOUS at 12:47

## 2019-09-17 RX ADMIN — Medication 25 MILLIGRAM(S): at 05:46

## 2019-09-17 RX ADMIN — Medication 1000 MILLIGRAM(S): at 17:34

## 2019-09-17 RX ADMIN — Medication 1000 MILLIGRAM(S): at 05:46

## 2019-09-17 RX ADMIN — Medication 1 TABLET(S): at 12:47

## 2019-09-17 RX ADMIN — Medication 75 MICROGRAM(S): at 05:46

## 2019-09-17 NOTE — PROGRESS NOTE ADULT - SUBJECTIVE AND OBJECTIVE BOX
[facility]  INFECTIOUS DISEASES AND INTERNAL MEDICINE at [place]  =======================================================  Zach Ross MD  Diplomates American Board of Internal Medicine and Infectious Diseases  =======================================================    [id number]  MAGNO BUCHANAN     Follow up: Fever and pneumonia     Still has fever, has dry cough during exam.   Not answering questions.     PAST MEDICAL & SURGICAL HISTORY:  Schizophrenia    Social Hx: no smoking or toxic habits.     FAMILY HISTORY:  Unknown    Allergies  No Known Allergies    Antibiotics:  piperacillin/tazobactam   azithromycin     REVIEW OF SYSTEMS:  CONSTITUTIONAL:  No Fever or chills  HEENT:  No diplopia or blurred vision.  No sore throat or runny nose.  CARDIOVASCULAR:  No chest pain or SOB.  RESPIRATORY:  +cough, no shortness of breath, PND or orthopnea.  GASTROINTESTINAL:  No nausea, vomiting or diarrhea.  GENITOURINARY:  No dysuria, frequency or urgency. No Blood in urine  MUSCULOSKELETAL:  no joint aches, no muscle pain  SKIN:  No change in skin, hair or nails.  NEUROLOGIC:  No paresthesias, fasciculations, seizures or weakness.  PSYCHIATRIC:  No disorder of thought or mood.  ENDOCRINE:  No heat or cold intolerance, polyuria or polydipsia.  HEMATOLOGICAL:  No easy bruising or bleeding.     Physical Exam:  Vital Signs Last 24 Hrs  T(C): 37.8 (17 Sep 2019 08:07), Max: 39.4 (16 Sep 2019 16:53)  T(F): 100.1 (17 Sep 2019 08:07), Max: 103 (16 Sep 2019 16:53)  HR: 107 (17 Sep 2019 08:07) (80 - 112)  BP: 97/57 (17 Sep 2019 08:07) (97/57 - 124/69)  BP(mean): --  RR: 18 (17 Sep 2019 08:07) (18 - 20)  SpO2: 93% (17 Sep 2019 08:07) (93% - 95%)  GEN: NAD  HEENT: normocephalic and atraumatic. EOMI. PERRL.    NECK: Supple.  No lymphadenopathy   LUNGS: Clear to auscultation. occasional rhonchi   HEART: Regular rate and rhythm without murmur.  ABDOMEN: Soft, nontender, and nondistended.  Positive bowel sounds.    : No CVA tenderness  EXTREMITIES: Without any cyanosis, clubbing, rash, lesions or edema.  NEUROLOGIC: grossly intact.  PSYCHIATRIC: Appropriate affect .  SKIN: No ulceration or induration present.    Labs:  09-16    139  |  105  |  9.0  ----------------------------<  143<H>  3.7   |  23.0  |  0.46<L>    Ca    8.3<L>      16 Sep 2019 20:38    TPro  5.3<L>  /  Alb  2.7<L>  /  TBili  <0.2<L>  /  DBili  x   /  AST  38<H>  /  ALT  31  /  AlkPhos  58  09-16                        11.0   6.29  )-----------( 183      ( 16 Sep 2019 20:38 )             33.0     LIVER FUNCTIONS - ( 16 Sep 2019 20:38 )  Alb: 2.7 g/dL / Pro: 5.3 g/dL / ALK PHOS: 58 U/L / ALT: 31 U/L / AST: 38 U/L / GGT: x           RECENT CULTURES:  09-14 @ 14:01 .Urine     No growth    09-12 @ 16:39 .Blood     No growth at 48 hours    All imaging and other data have been reviewed.  Chest CT 11/13:   Impression: Small pericardial effusion.  1.2 cm nonspecific hypodense lesion in the right lobe of thyroid. Thyroid   ultrasound may be pursued for further evaluation.  Pulmonary consolidation/infiltrate in the right lower lobe; clinical   correlation with pneumonia is recommended. Follow-up chest CT may be   pursued in 4-6 weeks to ensure resolution.  Attention should be directed   to the small left lower lobe lung nodule on the follow-up chest CT to   ensure stability or resolution.  Mild nodular contour of the liver, suggestive of cirrhosis. Mild   dilatation of the common bile duct at 7 mm in caliber.    Assessment and Plan:   65y/o woman with PMH of schizophrenia was admitted on 11/12 with fever. She has no complaint today, denies any cough, SOB, abdominal pain or any other symptoms.   She lives a facility where they noticed that she is lethargic and less interactive. Also had fever 102.2 in ED so work up for fever was done, showed RLL consolidation.   Also CT showed mild dilatation in CBD that needs more work up since she is completely asymptomatic.     Pneumonia   Fever  Cirrhosis   CBD dilatation     - Blood culture negative 9/12  - UC neg 9/14  - Chest CT with RLL opacity   - Procalcitonin 0.20---0.44  - Urinary legionella ag negative   - Cardiology consult noted for pericardial effusion. TTE still pending.  - Abdominal USG and CT are canceled due to her psychosis  - Continue with zosyn 3.375gm q8h   - Continue Azithromycin 500mg daily   - Will send repeat blood culture since febrile daily.     Will follow.

## 2019-09-17 NOTE — PROGRESS NOTE ADULT - SUBJECTIVE AND OBJECTIVE BOX
CARDIOLOGY PROGRESS NOTE   ([facility])                                                                                                        Subjective:     Vitals:  T(C): 37.8 (09-17-19 @ 08:07), Max: 39.4 (09-16-19 @ 16:53)  HR: 107 (09-17-19 @ 08:07) (80 - 112)  BP: 97/57 (09-17-19 @ 08:07) (97/57 - 124/69)  RR: 18 (09-17-19 @ 08:07) (18 - 20)  SpO2: 93% (09-17-19 @ 08:07) (93% - 95%)  Wt(kg): --  I&O's Summary    16 Sep 2019 07:01  -  17 Sep 2019 07:00  --------------------------------------------------------  IN: 600 mL / OUT: 550 mL / NET: 50 mL          PHYSICAL EXAM:  Appearance: Normal	  HEENT:   Atraumatic  Cardiovascular: Normal S1 S2, No JVD, No murmurs, No edema  Respiratory: Lungs clear to auscultation	  Gastrointestinal:  Soft, Non-tender, + BS	  Skin: No rashes, No ecchymoses, No cyanosis  Neurologic: Alert and awake, able to move extremities  Extremities: No edema    TELEMETRY: 	    ECG:  	      DIAGNOSTIC TESTING:  [ ] Echocardiogram:  [ ]  Catheterization:  [ ] Stress Test:    OTHER: 	      CURRENT MEDICATIONS:    azithromycin   Tablet 500 milliGRAM(s) Oral daily  piperacillin/tazobactam IVPB.. 3.375 Gram(s) IV Intermittent every 8 hours    ALBUTerol/ipratropium for Nebulization 3 milliLiter(s) Nebulizer every 6 hours PRN    acetaminophen   Tablet .. 650 milliGRAM(s) Oral every 6 hours PRN  acetaminophen  Suppository .. 650 milliGRAM(s) Rectal every 6 hours PRN  chlorproMAZINE    Injectable 25 milliGRAM(s) IntraMuscular every 6 hours PRN  chlorproMAZINE    Tablet 25 milliGRAM(s) Oral two times a day  clonazePAM  Tablet 1 milliGRAM(s) Oral two times a day  valproic  acid Syrup 1000 milliGRAM(s) Oral two times a day    docusate sodium 100 milliGRAM(s) Oral two times a day    levothyroxine 75 MICROGram(s) Oral daily  simvastatin 20 milliGRAM(s) Oral at bedtime    enoxaparin Injectable 40 milliGRAM(s) SubCutaneous daily  influenza   Vaccine 0.5 milliLiter(s) IntraMuscular once  multivitamin 1 Tablet(s) Oral daily      LABS:	 	    CARDIAC MARKERS:  Troponin I:   CPK total =  CK MB=   CKMB index=                           11.0   6.29  )-----------( 183      ( 16 Sep 2019 20:38 )             33.0     09-16    139  |  105  |  9.0  ----------------------------<  143<H>  3.7   |  23.0  |  0.46<L>    Ca    8.3<L>      16 Sep 2019 20:38    TPro  5.3<L>  /  Alb  2.7<L>  /  TBili  <0.2<L>  /  DBili  x   /  AST  38<H>  /  ALT  31  /  AlkPhos  58  09-16    proBNP:   Lipid Profile:   HgA1c:   TSH: Thyroid Stimulating Hormone, Serum: 0.38 uIU/mL (09-13 @ 23:39) CARDIOLOGY PROGRESS NOTE   ([facility])                                                                                                        Subjective: no new c/p, alert, awake,    Vitals:  T(C): 37.8 (09-17-19 @ 08:07), Max: 39.4 (09-16-19 @ 16:53)  HR: 107 (09-17-19 @ 08:07) (80 - 112)  BP: 97/57 (09-17-19 @ 08:07) (97/57 - 124/69)  RR: 18 (09-17-19 @ 08:07) (18 - 20)  SpO2: 93% (09-17-19 @ 08:07) (93% - 95%)  Wt(kg): --  I&O's Summary    16 Sep 2019 07:01  -  17 Sep 2019 07:00  --------------------------------------------------------  IN: 600 mL / OUT: 550 mL / NET: 50 mL          PHYSICAL EXAM:  Appearance: Normal	  HEENT:   Atraumatic  Cardiovascular: Normal S1 S2, No JVD, No murmurs, No edema  Respiratory: Lungs clear to auscultation	  Gastrointestinal:  Soft, Non-tender, + BS	  Skin: No rashes, No ecchymoses, No cyanosis  Neurologic: Alert and awake, able to move extremities  Extremities: No edema    	      CURRENT MEDICATIONS:    azithromycin   Tablet 500 milliGRAM(s) Oral daily  piperacillin/tazobactam IVPB.. 3.375 Gram(s) IV Intermittent every 8 hours    ALBUTerol/ipratropium for Nebulization 3 milliLiter(s) Nebulizer every 6 hours PRN    acetaminophen   Tablet .. 650 milliGRAM(s) Oral every 6 hours PRN  acetaminophen  Suppository .. 650 milliGRAM(s) Rectal every 6 hours PRN  chlorproMAZINE    Injectable 25 milliGRAM(s) IntraMuscular every 6 hours PRN  chlorproMAZINE    Tablet 25 milliGRAM(s) Oral two times a day  clonazePAM  Tablet 1 milliGRAM(s) Oral two times a day  valproic  acid Syrup 1000 milliGRAM(s) Oral two times a day    docusate sodium 100 milliGRAM(s) Oral two times a day    levothyroxine 75 MICROGram(s) Oral daily  simvastatin 20 milliGRAM(s) Oral at bedtime    enoxaparin Injectable 40 milliGRAM(s) SubCutaneous daily  influenza   Vaccine 0.5 milliLiter(s) IntraMuscular once  multivitamin 1 Tablet(s) Oral daily      LABS:	 	                            11.0   6.29  )-----------( 183      ( 16 Sep 2019 20:38 )             33.0     09-16    139  |  105  |  9.0  ----------------------------<  143<H>  3.7   |  23.0  |  0.46<L>    Ca    8.3<L>      16 Sep 2019 20:38    TPro  5.3<L>  /  Alb  2.7<L>  /  TBili  <0.2<L>  /  DBili  x   /  AST  38<H>  /  ALT  31  /  AlkPhos  58  09-16    TSH: Thyroid Stimulating Hormone, Serum: 0.38 uIU/mL (09-13 @ 23:39)

## 2019-09-17 NOTE — PROGRESS NOTE ADULT - SUBJECTIVE AND OBJECTIVE BOX
CC: fever, PNA , agitation     INTERVAL HPI/ OVERNIGHT EVENTS: seen and examined . more awake and cooperative today. following some commands. pilgrim aid and rn at bedside. less agitated today     Vital Signs Last 24 Hrs  T(C): 37.8 (17 Sep 2019 08:07), Max: 39.4 (16 Sep 2019 16:53)  T(F): 100.1 (17 Sep 2019 08:07), Max: 103 (16 Sep 2019 16:53)  HR: 107 (17 Sep 2019 08:07) (80 - 107)  BP: 97/57 (17 Sep 2019 08:07) (97/57 - 122/72)  BP(mean): --  RR: 18 (17 Sep 2019 08:07) (18 - 20)  SpO2: 93% (17 Sep 2019 08:07) (93% - 95%)    PHYSICAL EXAM:  GENERAL: in bed , somewhat cooperative with exam   HEENT: NC, AT   CHEST/LUNG: diminished air entry BL.   HEART: S1S2+, Regular rate and rhythm; No murmurs, rubs, or gallops  ABDOMEN: Soft, Nontender, Nondistended; Bowel sounds present  EXTREMITIES:  no pitting edema ,   neuro:  moving all ext                           10.9   6.79  )-----------( 199      ( 17 Sep 2019 12:01 )             33.5   09-17    142  |  106  |  7.0<L>  ----------------------------<  109  3.9   |  25.0  |  0.37<L>    Ca    8.8      17 Sep 2019 12:01    TPro  5.5<L>  /  Alb  2.8<L>  /  TBili  <0.2<L>  /  DBili  x   /  AST  31  /  ALT  31  /  AlkPhos  55  09-17

## 2019-09-17 NOTE — PROGRESS NOTE ADULT - ASSESSMENT
A/P: pt. was sent in from her psych facility for fever. pt. not giving any history. pt. was asked if anything bothering she said  " NO " after that she not answering any questions. As per record pt. was somewhat lethargic and less active , usually more active at baseline. Pt. was not coughing at her facility as per aide at bedside. Aide also reported that pt. has no swallowing difficulty and is on regular diet. no abd. pain. no n/v/d per history.       Pericardial Effusion  Small pericardial effusion on CT chest  Awaiting for echo      Fever  Cirrhosis   CBD dilatation   On Abx as per ID

## 2019-09-17 NOTE — PROGRESS NOTE ADULT - ASSESSMENT
pt. was sent in from [place] for fever. patient baseline a+o x 0-1 , has been admitted to [facility] couple of weeks ago from LTC for worsening  behavior issues .     > RLL pna / Community acquired PNA vs aspiration   -c/w zosyn ,  azithromycin   -f/u cultures .  - ID consulted     >Dilated CBD ?/ found on ct chest / unclear if present previously /   -US abd pending, will change to bedside   -LFT normal , trend     >Pericardial effusion / unclear etiology   -f/u ECHO.  -cardio consult noted and appreciated     >hypernatremia / imrpoved   -monitor bmp     > thyroid nodule / Hypothyroidism   -will follow up outpatient .  -c/w meds       >. Hx of schizophrenia / behavior issues / possible dementia   -c/w  depakote and clonazepam.   -c/w 1:1   -c/w ativan/ haldol    -thorazine added as per psych     > DVT prophylaxis   -on lovenox.       >dispo: sw contacted [facility]. patient has no family listed as point of contact as per [facility] records.

## 2019-09-18 PROCEDURE — 99233 SBSQ HOSP IP/OBS HIGH 50: CPT

## 2019-09-18 PROCEDURE — 99232 SBSQ HOSP IP/OBS MODERATE 35: CPT

## 2019-09-18 PROCEDURE — 76705 ECHO EXAM OF ABDOMEN: CPT | Mod: 26

## 2019-09-18 RX ORDER — PIPERACILLIN AND TAZOBACTAM 4; .5 G/20ML; G/20ML
3.38 INJECTION, POWDER, LYOPHILIZED, FOR SOLUTION INTRAVENOUS EVERY 8 HOURS
Refills: 0 | Status: DISCONTINUED | OUTPATIENT
Start: 2019-09-18 | End: 2019-09-19

## 2019-09-18 RX ADMIN — AZITHROMYCIN 500 MILLIGRAM(S): 500 TABLET, FILM COATED ORAL at 12:42

## 2019-09-18 RX ADMIN — Medication 1 MILLIGRAM(S): at 18:33

## 2019-09-18 RX ADMIN — Medication 1 MILLIGRAM(S): at 06:22

## 2019-09-18 RX ADMIN — Medication 100 MILLIGRAM(S): at 18:31

## 2019-09-18 RX ADMIN — ENOXAPARIN SODIUM 40 MILLIGRAM(S): 100 INJECTION SUBCUTANEOUS at 12:41

## 2019-09-18 RX ADMIN — Medication 250 MILLIGRAM(S): at 06:22

## 2019-09-18 RX ADMIN — Medication 100 MILLIGRAM(S): at 06:22

## 2019-09-18 RX ADMIN — Medication 25 MILLIGRAM(S): at 18:31

## 2019-09-18 RX ADMIN — PIPERACILLIN AND TAZOBACTAM 25 GRAM(S): 4; .5 INJECTION, POWDER, LYOPHILIZED, FOR SOLUTION INTRAVENOUS at 21:29

## 2019-09-18 RX ADMIN — Medication 1000 MILLIGRAM(S): at 18:31

## 2019-09-18 RX ADMIN — Medication 25 MILLIGRAM(S): at 06:22

## 2019-09-18 RX ADMIN — Medication 1 TABLET(S): at 12:42

## 2019-09-18 RX ADMIN — Medication 25 MILLIGRAM(S): at 10:34

## 2019-09-18 RX ADMIN — Medication 75 MICROGRAM(S): at 06:22

## 2019-09-18 RX ADMIN — Medication 1000 MILLIGRAM(S): at 06:22

## 2019-09-18 RX ADMIN — Medication 250 MILLIGRAM(S): at 18:31

## 2019-09-18 RX ADMIN — Medication 650 MILLIGRAM(S): at 00:30

## 2019-09-18 RX ADMIN — PIPERACILLIN AND TAZOBACTAM 25 GRAM(S): 4; .5 INJECTION, POWDER, LYOPHILIZED, FOR SOLUTION INTRAVENOUS at 06:22

## 2019-09-18 RX ADMIN — SIMVASTATIN 20 MILLIGRAM(S): 20 TABLET, FILM COATED ORAL at 21:29

## 2019-09-18 NOTE — PROGRESS NOTE ADULT - SUBJECTIVE AND OBJECTIVE BOX
CC: fever, PNA , agitation     INTERVAL HPI/ OVERNIGHT EVENTS: seen and examined . more awake and cooperative today. following some commands. pilgrim aid and rn at bedside. less agitated today . fever improving , tmax 100.4     Vital Signs Last 24 Hrs  T(C): 36.7 (18 Sep 2019 18:01), Max: 38 (17 Sep 2019 22:53)  T(F): 98.1 (18 Sep 2019 18:01), Max: 100.4 (17 Sep 2019 22:53)  HR: 85 (18 Sep 2019 18:01) (85 - 98)  BP: 112/71 (18 Sep 2019 18:01) (106/63 - 112/71)  BP(mean): --  RR: 20 (18 Sep 2019 18:01) (18 - 20)  SpO2: 92% (18 Sep 2019 18:01) (92% - 96%)      PHYSICAL EXAM:  GENERAL: in bed , somewhat cooperative with exam   HEENT: NC, AT   CHEST/LUNG: diminished air entry BL.   HEART: S1S2+, Regular rate and rhythm; No murmurs, rubs, or gallops  ABDOMEN: Soft, Nontender, Nondistended; Bowel sounds present  EXTREMITIES:  no pitting edema ,   neuro:  moving all ext                                    10.9   6.79  )-----------( 199      ( 17 Sep 2019 12:01 )             33.5   09-17    142  |  106  |  7.0<L>  ----------------------------<  109  3.9   |  25.0  |  0.37<L>    Ca    8.8      17 Sep 2019 12:01    TPro  5.5<L>  /  Alb  2.8<L>  /  TBili  <0.2<L>  /  DBili  x   /  AST  31  /  ALT  31  /  AlkPhos  55  09-17

## 2019-09-18 NOTE — PROGRESS NOTE ADULT - SUBJECTIVE AND OBJECTIVE BOX
[facility]  INFECTIOUS DISEASES AND INTERNAL MEDICINE at [place]  =======================================================  Zach Ross MD  Diplomates American Board of Internal Medicine and Infectious Diseases  =======================================================    [id number]  MAGNO BUCHANAN     Follow up: Fever and pneumonia     Has low grade fever, had USG today, showed CBD 7mm with cholelithiasis.     PAST MEDICAL & SURGICAL HISTORY:  Schizophrenia    Social Hx: no smoking or toxic habits.     FAMILY HISTORY:  Unknown    Allergies  No Known Allergies    Antibiotics:  piperacillin/tazobactam   azithromycin     REVIEW OF SYSTEMS:  CONSTITUTIONAL:  No Fever or chills  HEENT:  No diplopia or blurred vision.  No sore throat or runny nose.  CARDIOVASCULAR:  No chest pain or SOB.  RESPIRATORY:  +cough, no shortness of breath, PND or orthopnea.  GASTROINTESTINAL:  No nausea, vomiting or diarrhea.  GENITOURINARY:  No dysuria, frequency or urgency. No Blood in urine  MUSCULOSKELETAL:  no joint aches, no muscle pain  SKIN:  No change in skin, hair or nails.  NEUROLOGIC:  No paresthesias, fasciculations, seizures or weakness.  PSYCHIATRIC:  No disorder of thought or mood.  ENDOCRINE:  No heat or cold intolerance, polyuria or polydipsia.  HEMATOLOGICAL:  No easy bruising or bleeding.     Physical Exam:  Vital Signs Last 24 Hrs  T(C): 36.8 (18 Sep 2019 07:55), Max: 38 (17 Sep 2019 22:53)  T(F): 98.3 (18 Sep 2019 07:55), Max: 100.4 (17 Sep 2019 22:53)  HR: 92 (18 Sep 2019 07:55) (92 - 115)  BP: 106/63 (18 Sep 2019 07:55) (106/63 - 131/60)  BP(mean): --  RR: 20 (18 Sep 2019 07:55) (18 - 20)  SpO2: 96% (17 Sep 2019 22:53) (91% - 96%)  GEN: NAD  HEENT: normocephalic and atraumatic. EOMI. PERRL.    NECK: Supple.  No lymphadenopathy   LUNGS: Clear to auscultation. occasional rhonchi   HEART: Regular rate and rhythm without murmur.  ABDOMEN: Soft, nontender, and nondistended.  Positive bowel sounds.    : No CVA tenderness  EXTREMITIES: Without any cyanosis, clubbing, rash, lesions or edema.  NEUROLOGIC: grossly intact.  PSYCHIATRIC: Appropriate affect .  SKIN: No ulceration or induration present.    Labs:  09-17    142  |  106  |  7.0<L>  ----------------------------<  109  3.9   |  25.0  |  0.37<L>    Ca    8.8      17 Sep 2019 12:01    TPro  5.5<L>  /  Alb  2.8<L>  /  TBili  <0.2<L>  /  DBili  x   /  AST  31  /  ALT  31  /  AlkPhos  55  09-17                          10.9   6.79  )-----------( 199      ( 17 Sep 2019 12:01 )             33.5     LIVER FUNCTIONS - ( 17 Sep 2019 12:01 )  Alb: 2.8 g/dL / Pro: 5.5 g/dL / ALK PHOS: 55 U/L / ALT: 31 U/L / AST: 31 U/L / GGT: x           RECENT CULTURES:  09-14 @ 14:01 .Urine     No growth    09-12 @ 16:39 .Blood     No growth at 5 days.    All imaging and other data have been reviewed.  Chest CT 11/13:   Impression: Small pericardial effusion.  1.2 cm nonspecific hypodense lesion in the right lobe of thyroid. Thyroid   ultrasound may be pursued for further evaluation.  Pulmonary consolidation/infiltrate in the right lower lobe; clinical   correlation with pneumonia is recommended. Follow-up chest CT may be   pursued in 4-6 weeks to ensure resolution.  Attention should be directed   to the small left lower lobe lung nodule on the follow-up chest CT to   ensure stability or resolution.  Mild nodular contour of the liver, suggestive of cirrhosis. Mild   dilatation of the common bile duct at 7 mm in caliber.    Assessment and Plan:   65y/o woman with PMH of schizophrenia was admitted on 11/12 with fever. She has no complaint today, denies any cough, SOB, abdominal pain or any other symptoms.   She lives a facility where they noticed that she is lethargic and less interactive. Also had fever 102.2 in ED so work up for fever was done, showed RLL consolidation.   Also CT showed mild dilatation in CBD that needs more work up since she is completely asymptomatic.     Pneumonia   Fever  Cirrhosis   CBD dilatation     - Blood culture negative 9/12, repeat one pending from 9/17.   - UC neg 9/14  - Chest CT with RLL opacity   - Procalcitonin low  - Urinary legionella ag negative   - Cardiology consult noted for pericardial effusion. TTE still pending.  - Abdominal USG with CBD 7mm and cholelithiasis  - Continue with zosyn 3.375gm q8h   - Stop Azithromycin completed the course.     Will follow.

## 2019-09-18 NOTE — PROGRESS NOTE ADULT - ASSESSMENT
pt. was sent in from [place] for fever. patient baseline a+o x 0-1 , has been admitted to [place] couple of weeks ago from LTC for worsening  behavior issues .     > RLL pna / Community acquired PNA vs aspiration   -c/w zosyn ,    -s/p azithromycin x 5 days , now stopped   -f/u cultures .  - ID consulted     >Dilated CBD ?/ found on ct chest / unclear if present previously /   -US abd cbd 7 mm, cholelithiasis   -LFT normal , trend     >Pericardial effusion / unclear etiology   -f/u ECHO,     >hypernatremia / imrpoved   -monitor bmp     > thyroid nodule / Hypothyroidism   -will follow up outpatient .  -c/w meds     >. Hx of schizophrenia / behavior issues / possible dementia   -c/w  depakote and clonazepam.   -c/w 1:1   -c/w ativan/ haldol    -thorazine added as per psych     > DVT prophylaxis   -on lovenox.       >dispo: sw contacted [facility]. patient has no family listed as point of contact as per [facility] records.

## 2019-09-19 LAB
ALBUMIN SERPL ELPH-MCNC: 2.9 G/DL — LOW (ref 3.3–5.2)
ALP SERPL-CCNC: 65 U/L — SIGNIFICANT CHANGE UP (ref 40–120)
ALT FLD-CCNC: 65 U/L — HIGH
ANION GAP SERPL CALC-SCNC: 11 MMOL/L — SIGNIFICANT CHANGE UP (ref 5–17)
AST SERPL-CCNC: 47 U/L — HIGH
BILIRUB SERPL-MCNC: <0.2 MG/DL — LOW (ref 0.4–2)
BUN SERPL-MCNC: 7 MG/DL — LOW (ref 8–20)
CALCIUM SERPL-MCNC: 8.9 MG/DL — SIGNIFICANT CHANGE UP (ref 8.6–10.2)
CHLORIDE SERPL-SCNC: 105 MMOL/L — SIGNIFICANT CHANGE UP (ref 98–107)
CO2 SERPL-SCNC: 25 MMOL/L — SIGNIFICANT CHANGE UP (ref 22–29)
CREAT SERPL-MCNC: 0.39 MG/DL — LOW (ref 0.5–1.3)
GLUCOSE SERPL-MCNC: 108 MG/DL — SIGNIFICANT CHANGE UP (ref 70–115)
HCT VFR BLD CALC: 34.4 % — LOW (ref 34.5–45)
HGB BLD-MCNC: 11.3 G/DL — LOW (ref 11.5–15.5)
MCHC RBC-ENTMCNC: 30.6 PG — SIGNIFICANT CHANGE UP (ref 27–34)
MCHC RBC-ENTMCNC: 32.8 GM/DL — SIGNIFICANT CHANGE UP (ref 32–36)
MCV RBC AUTO: 93.2 FL — SIGNIFICANT CHANGE UP (ref 80–100)
PLATELET # BLD AUTO: 340 K/UL — SIGNIFICANT CHANGE UP (ref 150–400)
POTASSIUM SERPL-MCNC: 4.2 MMOL/L — SIGNIFICANT CHANGE UP (ref 3.5–5.3)
POTASSIUM SERPL-SCNC: 4.2 MMOL/L — SIGNIFICANT CHANGE UP (ref 3.5–5.3)
PROT SERPL-MCNC: 6 G/DL — LOW (ref 6.6–8.7)
RBC # BLD: 3.69 M/UL — LOW (ref 3.8–5.2)
RBC # FLD: 14.4 % — SIGNIFICANT CHANGE UP (ref 10.3–14.5)
SODIUM SERPL-SCNC: 141 MMOL/L — SIGNIFICANT CHANGE UP (ref 135–145)
WBC # BLD: 7.06 K/UL — SIGNIFICANT CHANGE UP (ref 3.8–10.5)
WBC # FLD AUTO: 7.06 K/UL — SIGNIFICANT CHANGE UP (ref 3.8–10.5)

## 2019-09-19 PROCEDURE — 99232 SBSQ HOSP IP/OBS MODERATE 35: CPT

## 2019-09-19 RX ADMIN — Medication 25 MILLIGRAM(S): at 14:12

## 2019-09-19 RX ADMIN — Medication 1000 MILLIGRAM(S): at 06:19

## 2019-09-19 RX ADMIN — Medication 100 MILLIGRAM(S): at 18:22

## 2019-09-19 RX ADMIN — PIPERACILLIN AND TAZOBACTAM 25 GRAM(S): 4; .5 INJECTION, POWDER, LYOPHILIZED, FOR SOLUTION INTRAVENOUS at 14:08

## 2019-09-19 RX ADMIN — Medication 25 MILLIGRAM(S): at 06:19

## 2019-09-19 RX ADMIN — Medication 250 MILLIGRAM(S): at 18:23

## 2019-09-19 RX ADMIN — Medication 25 MILLIGRAM(S): at 18:23

## 2019-09-19 RX ADMIN — Medication 1 MILLIGRAM(S): at 06:19

## 2019-09-19 RX ADMIN — Medication 100 MILLIGRAM(S): at 06:19

## 2019-09-19 RX ADMIN — Medication 250 MILLIGRAM(S): at 06:19

## 2019-09-19 RX ADMIN — PIPERACILLIN AND TAZOBACTAM 25 GRAM(S): 4; .5 INJECTION, POWDER, LYOPHILIZED, FOR SOLUTION INTRAVENOUS at 06:19

## 2019-09-19 RX ADMIN — Medication 1 MILLIGRAM(S): at 18:25

## 2019-09-19 RX ADMIN — ENOXAPARIN SODIUM 40 MILLIGRAM(S): 100 INJECTION SUBCUTANEOUS at 14:09

## 2019-09-19 RX ADMIN — Medication 75 MICROGRAM(S): at 06:19

## 2019-09-19 RX ADMIN — Medication 1000 MILLIGRAM(S): at 18:22

## 2019-09-19 RX ADMIN — SIMVASTATIN 20 MILLIGRAM(S): 20 TABLET, FILM COATED ORAL at 22:59

## 2019-09-19 RX ADMIN — Medication 1 TABLET(S): at 14:09

## 2019-09-19 NOTE — PROGRESS NOTE ADULT - SUBJECTIVE AND OBJECTIVE BOX
[facility]  INFECTIOUS DISEASES AND INTERNAL MEDICINE at [place]  =======================================================  Zach Ross MD  Diplomates American Board of Internal Medicine and Infectious Diseases  =======================================================    [id number]  MAGNO BUCHANAN     Follow up: Fever and pneumonia     No more fever, USG showed CBD 7mm with cholelithiasis.     PAST MEDICAL & SURGICAL HISTORY:  Schizophrenia    Social Hx: no smoking or toxic habits.     FAMILY HISTORY:  Unknown    Allergies  No Known Allergies    Antibiotics:  piperacillin/tazobactam   azithromycin     REVIEW OF SYSTEMS:  CONSTITUTIONAL:  No Fever or chills  HEENT:  No diplopia or blurred vision.  No sore throat or runny nose.  CARDIOVASCULAR:  No chest pain or SOB.  RESPIRATORY:  +cough, no shortness of breath, PND or orthopnea.  GASTROINTESTINAL:  No nausea, vomiting or diarrhea.  GENITOURINARY:  No dysuria, frequency or urgency. No Blood in urine  MUSCULOSKELETAL:  no joint aches, no muscle pain  SKIN:  No change in skin, hair or nails.  NEUROLOGIC:  No paresthesias, fasciculations, seizures or weakness.  PSYCHIATRIC:  No disorder of thought or mood.  ENDOCRINE:  No heat or cold intolerance, polyuria or polydipsia.  HEMATOLOGICAL:  No easy bruising or bleeding.     Physical Exam:  Vital Signs Last 24 Hrs  T(C): 37.1 (19 Sep 2019 09:09), Max: 37.1 (19 Sep 2019 09:09)  T(F): 98.8 (19 Sep 2019 09:09), Max: 98.8 (19 Sep 2019 09:09)  HR: 87 (19 Sep 2019 09:09) (85 - 87)  BP: 104/67 (19 Sep 2019 09:09) (104/67 - 112/71)  BP(mean): --  RR: 20 (19 Sep 2019 09:09) (20 - 20)  SpO2: 90% (19 Sep 2019 00:23) (90% - 92%)  GEN: NAD  HEENT: normocephalic and atraumatic. EOMI. PERRL.    NECK: Supple.  No lymphadenopathy   LUNGS: Clear to auscultation. occasional rhonchi   HEART: Regular rate and rhythm without murmur.  ABDOMEN: Soft, nontender, and nondistended.  Positive bowel sounds.    : No CVA tenderness  EXTREMITIES: Without any cyanosis, clubbing, rash, lesions or edema.  NEUROLOGIC: grossly intact.  PSYCHIATRIC: Appropriate affect .  SKIN: No ulceration or induration present.    Labs:  09-17    142  |  106  |  7.0<L>  ----------------------------<  109  3.9   |  25.0  |  0.37<L>    Ca    8.8      17 Sep 2019 12:01    TPro  5.5<L>  /  Alb  2.8<L>  /  TBili  <0.2<L>  /  DBili  x   /  AST  31  /  ALT  31  /  AlkPhos  55  09-17                        10.9   6.79  )-----------( 199      ( 17 Sep 2019 12:01 )             33.5     LIVER FUNCTIONS - ( 17 Sep 2019 12:01 )  Alb: 2.8 g/dL / Pro: 5.5 g/dL / ALK PHOS: 55 U/L / ALT: 31 U/L / AST: 31 U/L / GGT: x           RECENT CULTURES:  09-14 @ 14:01 .Urine     No growth    09-12 @ 16:39 .Blood     No growth at 5 days.    All imaging and other data have been reviewed.  Chest CT 11/13:   Impression: Small pericardial effusion.  1.2 cm nonspecific hypodense lesion in the right lobe of thyroid. Thyroid   ultrasound may be pursued for further evaluation.  Pulmonary consolidation/infiltrate in the right lower lobe; clinical   correlation with pneumonia is recommended. Follow-up chest CT may be   pursued in 4-6 weeks to ensure resolution.  Attention should be directed   to the small left lower lobe lung nodule on the follow-up chest CT to   ensure stability or resolution.  Mild nodular contour of the liver, suggestive of cirrhosis. Mild   dilatation of the common bile duct at 7 mm in caliber.    Assessment and Plan:   65y/o woman with PMH of schizophrenia was admitted on 11/12 with fever. She has no complaint today, denies any cough, SOB, abdominal pain or any other symptoms.   She lives a facility where they noticed that she is lethargic and less interactive. Also had fever 102.2 in ED so work up for fever was done, showed RLL consolidation.   Also CT showed mild dilatation in CBD that needs more work up since she is completely asymptomatic.     Pneumonia   Fever  Cirrhosis   CBD dilatation     - Blood culture negative 9/12 and 9/17 negative to date.    - UC neg 9/14  - Chest CT with RLL opacity   - Procalcitonin low  - Urinary legionella ag negative   - Cardiology consult noted for pericardial effusion. TTE pending.  - Abdominal USG with CBD 7mm and cholelithiasis, LFTs normal, clinically no cholecystitis.  - Continue with zosyn 3.375gm q8h   - Completed Azithromycin course.  - If remains stable can complete 7days of treatment total.     Will follow.

## 2019-09-19 NOTE — PROGRESS NOTE ADULT - SUBJECTIVE AND OBJECTIVE BOX
CC: fever, PNA , agitation     INTERVAL HPI/ OVERNIGHT EVENTS: seen and examined . awake and cooperative but confused . following some commands. pilgrim aid and rn at bedside. no new fever episodes . tolerating po diet , denies any abd pain, dizziness or n/v/    Vital Signs Last 24 Hrs  T(C): 37 (19 Sep 2019 16:28), Max: 37.1 (19 Sep 2019 09:09)  T(F): 98.6 (19 Sep 2019 16:28), Max: 98.8 (19 Sep 2019 09:09)  HR: 87 (19 Sep 2019 16:28) (86 - 87)  BP: 108/66 (19 Sep 2019 16:28) (104/67 - 108/66)  BP(mean): --  RR: 20 (19 Sep 2019 16:28) (20 - 20)  SpO2: 96% (19 Sep 2019 16:28) (90% - 96%)    PHYSICAL EXAM:  GENERAL: in bed , cooperative with exam   HEENT: NC, AT   CHEST/LUNG: diminished air entry BL.   HEART: S1S2+, Regular rate and rhythm; No murmurs, rubs, or gallops  ABDOMEN: Soft, Nontender, Nondistended; Bowel sounds present, no Guidry's sign, no rebound tenderness   EXTREMITIES:  no pitting edema ,   neuro:  moving all ext                           11.3   7.06  )-----------( 340      ( 19 Sep 2019 12:48 )             34.4   09-19    141  |  105  |  7.0<L>  ----------------------------<  108  4.2   |  25.0  |  0.39<L>    Ca    8.9      19 Sep 2019 12:48    TPro  6.0<L>  /  Alb  2.9<L>  /  TBili  <0.2<L>  /  DBili  x   /  AST  47<H>  /  ALT  65<H>  /  AlkPhos  65  09-19

## 2019-09-19 NOTE — PROGRESS NOTE ADULT - ASSESSMENT
Schizophrenia no change   continue 1 to 1  continue current meds  awaiting medical clearance for return to [place]

## 2019-09-19 NOTE — PROGRESS NOTE ADULT - SUBJECTIVE AND OBJECTIVE BOX
chart reviewed patient seen now asleep aide from [place] reports no interval change overnight  Vital Signs Last 24 Hrs  T(C): 36.7 (19 Sep 2019 00:23), Max: 36.7 (18 Sep 2019 18:01)  T(F): 98.1 (19 Sep 2019 00:23), Max: 98.1 (18 Sep 2019 18:01)  HR: 86 (19 Sep 2019 00:23) (85 - 86)  BP: 105/65 (19 Sep 2019 00:23) (105/65 - 112/71)  BP(mean): --  RR: 20 (18 Sep 2019 18:01) (20 - 20)  SpO2: 90% (19 Sep 2019 00:23) (90% - 92%)  MEDICATIONS  (STANDING):  chlorproMAZINE    Tablet 25 milliGRAM(s) Oral two times a day  clonazePAM  Tablet 1 milliGRAM(s) Oral two times a day  docusate sodium 100 milliGRAM(s) Oral two times a day  enoxaparin Injectable 40 milliGRAM(s) SubCutaneous daily  influenza   Vaccine 0.5 milliLiter(s) IntraMuscular once  levothyroxine 75 MICROGram(s) Oral daily  multivitamin 1 Tablet(s) Oral daily  piperacillin/tazobactam IVPB.. 3.375 Gram(s) IV Intermittent every 8 hours  saccharomyces boulardii 250 milliGRAM(s) Oral two times a day  simvastatin 20 milliGRAM(s) Oral at bedtime  valproic  acid Syrup 1000 milliGRAM(s) Oral two times a day

## 2019-09-19 NOTE — PROGRESS NOTE ADULT - SUBJECTIVE AND OBJECTIVE BOX
CARDIOLOGY PROGRESS NOTE   ([facility])                                                                                                        Subjective: nad, alert, awake, no cp    Vitals:  T(C): 36.7 (09-19-19 @ 00:23), Max: 36.7 (09-18-19 @ 18:01)  HR: 86 (09-19-19 @ 00:23) (85 - 86)  BP: 105/65 (09-19-19 @ 00:23) (105/65 - 112/71)  RR: 20 (09-18-19 @ 18:01) (20 - 20)  SpO2: 90% (09-19-19 @ 00:23) (90% - 92%)  Wt(kg): --  I&O's Summary    18 Sep 2019 07:01  -  19 Sep 2019 07:00  --------------------------------------------------------  IN: 680 mL / OUT: 0 mL / NET: 680 mL          PHYSICAL EXAM:  Appearance: Normal	  HEENT:   Atraumatic  Cardiovascular: Normal S1 S2, No JVD, No murmurs, No edema  Respiratory: Lungs clear to auscultation	  Gastrointestinal:  Soft, Non-tender, + BS	  Skin: No rashes, No ecchymoses, No cyanosis  Neurologic: Alert and awake, able to move extremities  Extremities: No edema        CURRENT MEDICATIONS:    piperacillin/tazobactam IVPB.. 3.375 Gram(s) IV Intermittent every 8 hours    ALBUTerol/ipratropium for Nebulization 3 milliLiter(s) Nebulizer every 6 hours PRN    acetaminophen   Tablet .. 650 milliGRAM(s) Oral every 6 hours PRN  acetaminophen  Suppository .. 650 milliGRAM(s) Rectal every 6 hours PRN  chlorproMAZINE    Injectable 25 milliGRAM(s) IntraMuscular every 6 hours PRN  chlorproMAZINE    Tablet 25 milliGRAM(s) Oral two times a day  clonazePAM  Tablet 1 milliGRAM(s) Oral two times a day  valproic  acid Syrup 1000 milliGRAM(s) Oral two times a day    docusate sodium 100 milliGRAM(s) Oral two times a day    levothyroxine 75 MICROGram(s) Oral daily  simvastatin 20 milliGRAM(s) Oral at bedtime    enoxaparin Injectable 40 milliGRAM(s) SubCutaneous daily  influenza   Vaccine 0.5 milliLiter(s) IntraMuscular once  multivitamin 1 Tablet(s) Oral daily      LABS:	 	                          10.9   6.79  )-----------( 199      ( 17 Sep 2019 12:01 )             33.5     09-17    142  |  106  |  7.0<L>  ----------------------------<  109  3.9   |  25.0  |  0.37<L>    Ca    8.8      17 Sep 2019 12:01    TPro  5.5<L>  /  Alb  2.8<L>  /  TBili  <0.2<L>  /  DBili  x   /  AST  31  /  ALT  31  /  AlkPhos  55  09-17      TSH: Thyroid Stimulating Hormone, Serum: 0.38 uIU/mL (09-13 @ 23:39)

## 2019-09-19 NOTE — PROGRESS NOTE ADULT - ASSESSMENT
pt. was sent in from [facility] for fever. patient baseline a+o x 0-1 , has been admitted [facility] ago from [facility] for worsening  behavior issues .     > RLL pna / Community acquired PNA vs aspiration   -dc zosyn today , completed x 7 days   -s/p azithromycin x 5 days , now stopped   -f/u cultures .  - ID consulted     >Dilated CBD ?/ found on ct chest / unclear if present previously /   -US abd cbd 7 mm, cholelithiasis   -LFT normal , trend     >Pericardial effusion / unclear etiology   -f/u ECHO,     >hypernatremia / imrpoved   -monitor bmp     > thyroid nodule / Hypothyroidism   -will follow up outpatient .  -c/w meds     >. Hx of schizophrenia / behavior issues / possible dementia   -c/w  depakote and clonazepam.   -c/w 1:1   -c/w ativan/ haldol    -thorazine added as per psych     > DVT prophylaxis   -on lovenox.       >dispo: possible dc to [facility] in am if medically stable.  pending echo

## 2019-09-19 NOTE — PROGRESS NOTE ADULT - ASSESSMENT
pt. was sent in from her psych facility for fever. pt. not giving any history. pt. was asked if anything bothering she said  " NO " after that she not answering any questions. As per record pt. was somewhat lethargic and less active , usually more active at baseline. Pt. was not coughing at her facility as per aide at bedside. Aide also reported that pt. has no swallowing difficulty and is on regular diet. no abd. pain. no n/v/d per history.       Pericardial Effusion  Small pericardial effusion seen on CT chest  Awaiting for echo to evaluate    Schizophrenia  As per pysch    Pneumonia   Fever  Cirrhosis   CBD dilatation

## 2019-09-20 ENCOUNTER — TRANSCRIPTION ENCOUNTER (OUTPATIENT)
Age: 64
End: 2019-09-20

## 2019-09-20 VITALS
HEART RATE: 94 BPM | RESPIRATION RATE: 20 BRPM | OXYGEN SATURATION: 96 % | SYSTOLIC BLOOD PRESSURE: 104 MMHG | DIASTOLIC BLOOD PRESSURE: 68 MMHG | TEMPERATURE: 98 F

## 2019-09-20 LAB
ANION GAP SERPL CALC-SCNC: 12 MMOL/L — SIGNIFICANT CHANGE UP (ref 5–17)
BUN SERPL-MCNC: 9 MG/DL — SIGNIFICANT CHANGE UP (ref 8–20)
CALCIUM SERPL-MCNC: 8.5 MG/DL — LOW (ref 8.6–10.2)
CHLORIDE SERPL-SCNC: 103 MMOL/L — SIGNIFICANT CHANGE UP (ref 98–107)
CO2 SERPL-SCNC: 24 MMOL/L — SIGNIFICANT CHANGE UP (ref 22–29)
CREAT SERPL-MCNC: 0.41 MG/DL — LOW (ref 0.5–1.3)
GLUCOSE SERPL-MCNC: 125 MG/DL — HIGH (ref 70–115)
HCT VFR BLD CALC: 33.1 % — LOW (ref 34.5–45)
HGB BLD-MCNC: 10.6 G/DL — LOW (ref 11.5–15.5)
MCHC RBC-ENTMCNC: 30 PG — SIGNIFICANT CHANGE UP (ref 27–34)
MCHC RBC-ENTMCNC: 32 GM/DL — SIGNIFICANT CHANGE UP (ref 32–36)
MCV RBC AUTO: 93.8 FL — SIGNIFICANT CHANGE UP (ref 80–100)
PLATELET # BLD AUTO: 406 K/UL — HIGH (ref 150–400)
POTASSIUM SERPL-MCNC: 3.8 MMOL/L — SIGNIFICANT CHANGE UP (ref 3.5–5.3)
POTASSIUM SERPL-SCNC: 3.8 MMOL/L — SIGNIFICANT CHANGE UP (ref 3.5–5.3)
RBC # BLD: 3.53 M/UL — LOW (ref 3.8–5.2)
RBC # FLD: 14.5 % — SIGNIFICANT CHANGE UP (ref 10.3–14.5)
SODIUM SERPL-SCNC: 139 MMOL/L — SIGNIFICANT CHANGE UP (ref 135–145)
WBC # BLD: 7.52 K/UL — SIGNIFICANT CHANGE UP (ref 3.8–10.5)
WBC # FLD AUTO: 7.52 K/UL — SIGNIFICANT CHANGE UP (ref 3.8–10.5)

## 2019-09-20 PROCEDURE — 83605 ASSAY OF LACTIC ACID: CPT

## 2019-09-20 PROCEDURE — 99232 SBSQ HOSP IP/OBS MODERATE 35: CPT

## 2019-09-20 PROCEDURE — 84480 ASSAY TRIIODOTHYRONINE (T3): CPT

## 2019-09-20 PROCEDURE — 84443 ASSAY THYROID STIM HORMONE: CPT

## 2019-09-20 PROCEDURE — 87086 URINE CULTURE/COLONY COUNT: CPT

## 2019-09-20 PROCEDURE — 80048 BASIC METABOLIC PNL TOTAL CA: CPT

## 2019-09-20 PROCEDURE — 96372 THER/PROPH/DIAG INJ SC/IM: CPT | Mod: XU

## 2019-09-20 PROCEDURE — 85730 THROMBOPLASTIN TIME PARTIAL: CPT

## 2019-09-20 PROCEDURE — 70450 CT HEAD/BRAIN W/O DYE: CPT

## 2019-09-20 PROCEDURE — 93005 ELECTROCARDIOGRAM TRACING: CPT

## 2019-09-20 PROCEDURE — 87040 BLOOD CULTURE FOR BACTERIA: CPT

## 2019-09-20 PROCEDURE — 87631 RESP VIRUS 3-5 TARGETS: CPT

## 2019-09-20 PROCEDURE — 84145 PROCALCITONIN (PCT): CPT

## 2019-09-20 PROCEDURE — 99239 HOSP IP/OBS DSCHRG MGMT >30: CPT

## 2019-09-20 PROCEDURE — 80202 ASSAY OF VANCOMYCIN: CPT

## 2019-09-20 PROCEDURE — 80053 COMPREHEN METABOLIC PANEL: CPT

## 2019-09-20 PROCEDURE — 85610 PROTHROMBIN TIME: CPT

## 2019-09-20 PROCEDURE — 86803 HEPATITIS C AB TEST: CPT

## 2019-09-20 PROCEDURE — 96375 TX/PRO/DX INJ NEW DRUG ADDON: CPT

## 2019-09-20 PROCEDURE — 76705 ECHO EXAM OF ABDOMEN: CPT

## 2019-09-20 PROCEDURE — 87449 NOS EACH ORGANISM AG IA: CPT

## 2019-09-20 PROCEDURE — 80076 HEPATIC FUNCTION PANEL: CPT

## 2019-09-20 PROCEDURE — 81001 URINALYSIS AUTO W/SCOPE: CPT

## 2019-09-20 PROCEDURE — 71045 X-RAY EXAM CHEST 1 VIEW: CPT

## 2019-09-20 PROCEDURE — 85027 COMPLETE CBC AUTOMATED: CPT

## 2019-09-20 PROCEDURE — 93306 TTE W/DOPPLER COMPLETE: CPT

## 2019-09-20 PROCEDURE — 71250 CT THORAX DX C-: CPT

## 2019-09-20 PROCEDURE — 96374 THER/PROPH/DIAG INJ IV PUSH: CPT

## 2019-09-20 PROCEDURE — 99285 EMERGENCY DEPT VISIT HI MDM: CPT | Mod: 25

## 2019-09-20 PROCEDURE — 36415 COLL VENOUS BLD VENIPUNCTURE: CPT

## 2019-09-20 PROCEDURE — 80164 ASSAY DIPROPYLACETIC ACD TOT: CPT

## 2019-09-20 PROCEDURE — 84702 CHORIONIC GONADOTROPIN TEST: CPT

## 2019-09-20 PROCEDURE — 84436 ASSAY OF TOTAL THYROXINE: CPT

## 2019-09-20 RX ORDER — CHLORPROMAZINE HCL 10 MG
1 TABLET ORAL
Qty: 0 | Refills: 0 | DISCHARGE
Start: 2019-09-20

## 2019-09-20 RX ORDER — RISPERIDONE 4 MG/1
50 TABLET ORAL ONCE
Refills: 0 | Status: COMPLETED | OUTPATIENT
Start: 2019-09-20 | End: 2019-09-20

## 2019-09-20 RX ORDER — IPRATROPIUM/ALBUTEROL SULFATE 18-103MCG
3 AEROSOL WITH ADAPTER (GRAM) INHALATION
Qty: 0 | Refills: 0 | DISCHARGE
Start: 2019-09-20

## 2019-09-20 RX ORDER — ACETAMINOPHEN 500 MG
2 TABLET ORAL
Qty: 0 | Refills: 0 | DISCHARGE
Start: 2019-09-20

## 2019-09-20 RX ORDER — VALPROIC ACID (AS SODIUM SALT) 250 MG/5ML
1000 SOLUTION, ORAL ORAL
Qty: 0 | Refills: 0 | DISCHARGE
Start: 2019-09-20

## 2019-09-20 RX ORDER — CLONAZEPAM 1 MG
1 TABLET ORAL
Qty: 0 | Refills: 0 | DISCHARGE
Start: 2019-09-20

## 2019-09-20 RX ADMIN — Medication 1 MILLIGRAM(S): at 06:21

## 2019-09-20 RX ADMIN — Medication 250 MILLIGRAM(S): at 06:21

## 2019-09-20 RX ADMIN — ENOXAPARIN SODIUM 40 MILLIGRAM(S): 100 INJECTION SUBCUTANEOUS at 13:05

## 2019-09-20 RX ADMIN — Medication 1000 MILLIGRAM(S): at 17:22

## 2019-09-20 RX ADMIN — RISPERIDONE 50 MILLIGRAM(S): 4 TABLET ORAL at 13:05

## 2019-09-20 RX ADMIN — Medication 100 MILLIGRAM(S): at 06:21

## 2019-09-20 RX ADMIN — Medication 1 MILLIGRAM(S): at 17:21

## 2019-09-20 RX ADMIN — Medication 25 MILLIGRAM(S): at 06:21

## 2019-09-20 RX ADMIN — Medication 100 MILLIGRAM(S): at 17:21

## 2019-09-20 RX ADMIN — Medication 75 MICROGRAM(S): at 06:21

## 2019-09-20 RX ADMIN — Medication 250 MILLIGRAM(S): at 17:21

## 2019-09-20 RX ADMIN — Medication 1 TABLET(S): at 13:05

## 2019-09-20 RX ADMIN — Medication 1000 MILLIGRAM(S): at 06:21

## 2019-09-20 RX ADMIN — Medication 25 MILLIGRAM(S): at 17:22

## 2019-09-20 NOTE — PROGRESS NOTE ADULT - PROVIDER SPECIALTY LIST ADULT
Cardiology
Cardiology
Infectious Disease
Infectious Disease
Cardiology
Cardiology
Infectious Disease
Psychiatry
Hospitalist
Hospitalist
Infectious Disease
Infectious Disease
Psychiatry
Hospitalist
Infectious Disease
Hospitalist
Hospitalist

## 2019-09-20 NOTE — DISCHARGE NOTE PROVIDER - HOSPITAL COURSE
63y/o woman with PMH of schizophrenia  , has been admitted to [facility] couple of weeks ago from [facility] for worsening  behavior issues . was admitted on 11/12 with fever.patient baseline a+o x 0-1. workup showed RLL  pna . likely aspiration. completed x 5 course of azithromycin. x 7 days course of zosyn. Also CT showed mild dilatation in CBD , US abd didnt showed only upper limit of normal cholelithiasis but no cbd stone noted, LFTs normal, clinically no cholecystitis. she is completely asymptomatic. tolerating po diet well. no n/v. abd soft non tender , murphys neg. afebrile now. Blood culture negative 9/12 and 9/17 negative to date.  UC neg 9/14. Urinary legionella ag negative . Ct chest showed left lower lung lobe 5 mm nodule. needs op repeat ct chest in 4-6 weeks.     also has right thyroid lobe 1.2 cm nodule. TFTs nl. needs op thyroid us in 2-4 weeks.     patient also found to have mild pericardial effusion on ct chest . TTE showed only mild effusion but no temponade, ef preserved.      Patient has Hx of schizophrenia / behavior issues / possible dementia. seen by psychiatry and started on thorazine. also valporic acid dose was increased. patient is more cooperative now. also have less agitations.         Vital Signs Last 24 Hrs    T(C): 36.7 (20 Sep 2019 09:32), Max: 37 (19 Sep 2019 16:28)    T(F): 98 (20 Sep 2019 09:32), Max: 98.6 (19 Sep 2019 16:28)    HR: 88 (20 Sep 2019 09:32) (87 - 94)    BP: 110/70 (20 Sep 2019 09:32) (108/66 - 117/75)    BP(mean): --    RR: 20 (20 Sep 2019 09:32) (20 - 20)    SpO2: 96% (19 Sep 2019 21:49) (96% - 96%)        Dr. clemons at [facility] has been and called and updated with dc plan of care.     time spent for this dc 45 mins.

## 2019-09-20 NOTE — PROGRESS NOTE BEHAVIORAL HEALTH - NSBHCHARTREVIEWVS_PSY_A_CORE FT
Vital Signs Last 24 Hrs  T(C): 37.6 (16 Sep 2019 00:56), Max: 39.9 (15 Sep 2019 18:29)  T(F): 99.6 (16 Sep 2019 00:56), Max: 103.8 (15 Sep 2019 18:29)  HR: 110 (16 Sep 2019 00:56) (71 - 110)  BP: 146/66 (16 Sep 2019 00:56) (119/74 - 146/66)  RR: 16 (15 Sep 2019 18:29) (16 - 19)  SpO2: 97% (15 Sep 2019 23:00) (92% - 97%)
Vital Signs Last 24 Hrs  T(C): 36.7 (20 Sep 2019 09:32), Max: 37 (19 Sep 2019 16:28)  T(F): 98 (20 Sep 2019 09:32), Max: 98.6 (19 Sep 2019 16:28)  HR: 88 (20 Sep 2019 09:32) (87 - 94)  BP: 110/70 (20 Sep 2019 09:32) (108/66 - 117/75)  BP(mean): --  RR: 20 (20 Sep 2019 09:32) (20 - 20)  SpO2: 96% (19 Sep 2019 21:49) (96% - 96%)
Vital Signs Last 24 Hrs  T(C): 38.3 (16 Sep 2019 17:28), Max: 39.4 (16 Sep 2019 16:53)  T(F): 101 (16 Sep 2019 17:28), Max: 103 (16 Sep 2019 16:53)  HR: 82 (16 Sep 2019 17:28) (80 - 112)  BP: 119/62 (16 Sep 2019 17:28) (111/62 - 124/69)  BP(mean): --  RR: 18 (16 Sep 2019 17:28) (18 - 20)  SpO2: 94% (16 Sep 2019 17:28) (94% - 95%)
Vital Signs Last 24 Hrs  T(C): 37.4 (18 Sep 2019 00:50), Max: 38 (17 Sep 2019 22:53)  T(F): 99.4 (18 Sep 2019 00:50), Max: 100.4 (17 Sep 2019 22:53)  HR: 98 (17 Sep 2019 22:53) (98 - 115)  BP: 107/63 (17 Sep 2019 22:53) (97/57 - 131/60)  BP(mean): --  RR: 18 (17 Sep 2019 22:53) (18 - 18)  SpO2: 96% (17 Sep 2019 22:53) (91% - 96%)

## 2019-09-20 NOTE — PROGRESS NOTE BEHAVIORAL HEALTH - PRIMARY DX
Schizophrenia, unspecified type

## 2019-09-20 NOTE — DISCHARGE NOTE PROVIDER - NSDCCPCAREPLAN_GEN_ALL_CORE_FT
PRINCIPAL DISCHARGE DIAGNOSIS  Diagnosis: Pneumonia  Assessment and Plan of Treatment: likely aspiration pneumonia. completed x 7 days course of antibiotics.   found to have incidental Left lower lung nodule.   need repeat ct chest in 4-6 weeks.   aspiration precautions         SECONDARY DISCHARGE DIAGNOSES  Diagnosis: Thyroid lesion  Assessment and Plan of Treatment: you are found to have incidental findings of right thryroid nodule.   thyroid funstions tests normal.   repeat thyroid function test in 4-6 weeks.   follow up out patient thyroid US.    Diagnosis: Schizophrenia, unspecified type  Assessment and Plan of Treatment: continue with medcations as advised.

## 2019-09-20 NOTE — PROGRESS NOTE BEHAVIORAL HEALTH - SUMMARY
more alert accepting meds will monitor
medication adjustments made due to dangerous agitation
Patient psychiatric condition is unchanged, is now medically stable per hospitalist team to be discharged pending echo results. Patient continues to be psychiatrically stable for discharge back to [facility].
Patient continues to be non-violent and cooperative with medication administration. Will monitor.

## 2019-09-20 NOTE — PROGRESS NOTE BEHAVIORAL HEALTH - THOUGHT PROCESS
Illogical/Impaired reasoning/Disorganized

## 2019-09-20 NOTE — PROGRESS NOTE ADULT - SUBJECTIVE AND OBJECTIVE BOX
[facility]  INFECTIOUS DISEASES AND INTERNAL MEDICINE at [place]  =======================================================  Zach Ross MD  Diplomates American Board of Internal Medicine and Infectious Diseases  =======================================================    [id number]  MAGNO BUCHANAN     Follow up: Fever and pneumonia     No more fever, still has cough on and off. USG showed CBD 7mm with cholelithiasis but clinically no pain or GI symptoms.      PAST MEDICAL & SURGICAL HISTORY:  Schizophrenia    Social Hx: no smoking or toxic habits.     FAMILY HISTORY:  Unknown    Allergies  No Known Allergies    Antibiotics:  piperacillin/tazobactam   azithromycin     REVIEW OF SYSTEMS:  CONSTITUTIONAL:  No Fever or chills  HEENT:  No diplopia or blurred vision.  No sore throat or runny nose.  CARDIOVASCULAR:  No chest pain or SOB.  RESPIRATORY:  +cough, no shortness of breath, PND or orthopnea.  GASTROINTESTINAL:  No nausea, vomiting or diarrhea.  GENITOURINARY:  No dysuria, frequency or urgency. No Blood in urine  MUSCULOSKELETAL:  no joint aches, no muscle pain  SKIN:  No change in skin, hair or nails.  NEUROLOGIC:  No paresthesias, fasciculations, seizures or weakness.  PSYCHIATRIC:  No disorder of thought or mood.  ENDOCRINE:  No heat or cold intolerance, polyuria or polydipsia.  HEMATOLOGICAL:  No easy bruising or bleeding.     Physical Exam:  Vital Signs Last 24 Hrs  T(C): 36.7 (20 Sep 2019 09:32), Max: 37 (19 Sep 2019 16:28)  T(F): 98 (20 Sep 2019 09:32), Max: 98.6 (19 Sep 2019 16:28)  HR: 88 (20 Sep 2019 09:32) (87 - 94)  BP: 110/70 (20 Sep 2019 09:32) (108/66 - 117/75)  BP(mean): --  RR: 20 (20 Sep 2019 09:32) (20 - 20)  SpO2: 96% (19 Sep 2019 21:49) (96% - 96%)  GEN: NAD  HEENT: normocephalic and atraumatic. EOMI. PERRL.    NECK: Supple.  No lymphadenopathy   LUNGS: Clear to auscultation. occasional rhonchi   HEART: Regular rate and rhythm without murmur.  ABDOMEN: Soft, nontender, and nondistended.  Positive bowel sounds.    : No CVA tenderness  EXTREMITIES: Without any cyanosis, clubbing, rash, lesions or edema.  NEUROLOGIC: grossly intact.  PSYCHIATRIC: Appropriate affect .  SKIN: No ulceration or induration present.    Labs:  09-20    139  |  103  |  9.0  ----------------------------<  125<H>  3.8   |  24.0  |  0.41<L>    Ca    8.5<L>      20 Sep 2019 07:28    TPro  6.0<L>  /  Alb  2.9<L>  /  TBili  <0.2<L>  /  DBili  x   /  AST  47<H>  /  ALT  65<H>  /  AlkPhos  65  09-19                        10.6   7.52  )-----------( 406      ( 20 Sep 2019 07:28 )             33.1     LIVER FUNCTIONS - ( 19 Sep 2019 12:48 )  Alb: 2.9 g/dL / Pro: 6.0 g/dL / ALK PHOS: 65 U/L / ALT: 65 U/L / AST: 47 U/L / GGT: x           RECENT CULTURES:  09-17 @ 12:01 .Blood     No growth at 48 hours    09-14 @ 14:01 .Urine     No growth    09-12 @ 16:39 .Blood     No growth at 5 days.    All imaging and other data have been reviewed.  Chest CT 11/13:   Impression: Small pericardial effusion.  1.2 cm nonspecific hypodense lesion in the right lobe of thyroid. Thyroid   ultrasound may be pursued for further evaluation.  Pulmonary consolidation/infiltrate in the right lower lobe; clinical   correlation with pneumonia is recommended. Follow-up chest CT may be   pursued in 4-6 weeks to ensure resolution.  Attention should be directed   to the small left lower lobe lung nodule on the follow-up chest CT to   ensure stability or resolution.  Mild nodular contour of the liver, suggestive of cirrhosis. Mild   dilatation of the common bile duct at 7 mm in caliber.    Assessment and Plan:   63y/o woman with PMH of schizophrenia was admitted on 11/12 with fever. She has no complaint today, denies any cough, SOB, abdominal pain or any other symptoms.   She lives a facility where they noticed that she is lethargic and less interactive. Also had fever 102.2 in ED so work up for fever was done, showed RLL consolidation.   Also CT showed mild dilatation in CBD that needs more work up since she is completely asymptomatic.     Pneumonia   Fever  Cirrhosis   CBD dilatation     - Blood culture negative 9/12 and 9/17 negative to date.    - UC neg 9/14  - Chest CT with RLL opacity   - Procalcitonin low  - Urinary legionella ag negative   - Cardiology consult and LOLLY noted for mild pericardial effusion  - Abdominal USG with CBD 7mm and cholelithiasis, LFTs normal, clinically no cholecystitis.  - Completed zosyn and azithromycin course. Can stop all antibiotics.     Will sign off please call with any question.

## 2019-09-20 NOTE — PROGRESS NOTE ADULT - SUBJECTIVE AND OBJECTIVE BOX
CARDIOLOGY PROGRESS NOTE   ([facility])                                                                                                        Subjective:     Vitals:  T(C): 36.7 (09-20-19 @ 09:32), Max: 37 (09-19-19 @ 16:28)  HR: 88 (09-20-19 @ 09:32) (87 - 94)  BP: 110/70 (09-20-19 @ 09:32) (108/66 - 117/75)  RR: 20 (09-20-19 @ 09:32) (20 - 20)  SpO2: 96% (09-19-19 @ 21:49) (96% - 96%)  Wt(kg): --  I&O's Summary    19 Sep 2019 07:01  -  20 Sep 2019 07:00  --------------------------------------------------------  IN: 1200 mL / OUT: 0 mL / NET: 1200 mL          PHYSICAL EXAM:  Appearance: Normal	  HEENT:   Atraumatic  Cardiovascular: Normal S1 S2, No JVD, No murmurs, No edema  Respiratory: Lungs clear to auscultation	  Gastrointestinal:  Soft, Non-tender, + BS	  Skin: No rashes, No ecchymoses, No cyanosis  Neurologic: Alert and awake, able to move extremities  Extremities: No edema    TELEMETRY: 	    ECG:  	      DIAGNOSTIC TESTING:  [ ] Echocardiogram:  [ ]  Catheterization:  [ ] Stress Test:    OTHER: 	      CURRENT MEDICATIONS:      ALBUTerol/ipratropium for Nebulization 3 milliLiter(s) Nebulizer every 6 hours PRN    acetaminophen   Tablet .. 650 milliGRAM(s) Oral every 6 hours PRN  acetaminophen  Suppository .. 650 milliGRAM(s) Rectal every 6 hours PRN  chlorproMAZINE    Injectable 25 milliGRAM(s) IntraMuscular every 6 hours PRN  chlorproMAZINE    Tablet 25 milliGRAM(s) Oral two times a day  clonazePAM  Tablet 1 milliGRAM(s) Oral two times a day  valproic  acid Syrup 1000 milliGRAM(s) Oral two times a day    docusate sodium 100 milliGRAM(s) Oral two times a day    levothyroxine 75 MICROGram(s) Oral daily  simvastatin 20 milliGRAM(s) Oral at bedtime    enoxaparin Injectable 40 milliGRAM(s) SubCutaneous daily  influenza   Vaccine 0.5 milliLiter(s) IntraMuscular once  multivitamin 1 Tablet(s) Oral daily      LABS:	 	    CARDIAC MARKERS:  Troponin I:   CPK total =  CK MB=   CKMB index=                           10.6   7.52  )-----------( 406      ( 20 Sep 2019 07:28 )             33.1     09-20    139  |  103  |  9.0  ----------------------------<  125<H>  3.8   |  24.0  |  0.41<L>    Ca    8.5<L>      20 Sep 2019 07:28    TPro  6.0<L>  /  Alb  2.9<L>  /  TBili  <0.2<L>  /  DBili  x   /  AST  47<H>  /  ALT  65<H>  /  AlkPhos  65  09-19    proBNP:   Lipid Profile:   HgA1c:   TSH: CARDIOLOGY PROGRESS NOTE   ([facility])                                                                                                        Subjective: alert, awake, denied cp    Vitals:  T(C): 36.7 (19 @ 09:32), Max: 37 (19 @ 16:28)  HR: 88 (19 @ 09:32) (87 - 94)  BP: 110/70 (19 @ 09:32) (108/66 - 117/75)  RR: 20 (19 @ 09:32) (20 - 20)  SpO2: 96% (19 @ 21:49) (96% - 96%)  Wt(kg): --  I&O's Summary    19 Sep 2019 07:01  -  20 Sep 2019 07:00  --------------------------------------------------------  IN: 1200 mL / OUT: 0 mL / NET: 1200 mL          PHYSICAL EXAM:  Appearance: Normal	  HEENT:   Atraumatic  Cardiovascular: Normal S1 S2, No JVD, No murmurs, No edema  Respiratory: Lungs clear to auscultation	  Gastrointestinal:  Soft, Non-tender, + BS	  Skin: No rashes, No ecchymoses, No cyanosis  Neurologic: Alert and awake, able to move extremities  Extremities: No edema        CURRENT MEDICATIONS:      ALBUTerol/ipratropium for Nebulization 3 milliLiter(s) Nebulizer every 6 hours PRN    acetaminophen   Tablet .. 650 milliGRAM(s) Oral every 6 hours PRN  acetaminophen  Suppository .. 650 milliGRAM(s) Rectal every 6 hours PRN  chlorproMAZINE    Injectable 25 milliGRAM(s) IntraMuscular every 6 hours PRN  chlorproMAZINE    Tablet 25 milliGRAM(s) Oral two times a day  clonazePAM  Tablet 1 milliGRAM(s) Oral two times a day  valproic  acid Syrup 1000 milliGRAM(s) Oral two times a day    docusate sodium 100 milliGRAM(s) Oral two times a day    levothyroxine 75 MICROGram(s) Oral daily  simvastatin 20 milliGRAM(s) Oral at bedtime    enoxaparin Injectable 40 milliGRAM(s) SubCutaneous daily  influenza   Vaccine 0.5 milliLiter(s) IntraMuscular once  multivitamin 1 Tablet(s) Oral daily      LABS:	 	                              10.6   7.52  )-----------( 406      ( 20 Sep 2019 07:28 )             33.1         139  |  103  |  9.0  ----------------------------<  125<H>  3.8   |  24.0  |  0.41<L>    Ca    8.5<L>      20 Sep 2019 07:28    TPro  6.0<L>  /  Alb  2.9<L>  /  TBili  <0.2<L>  /  DBili  x   /  AST  47<H>  /  ALT  65<H>  /  AlkPhos  65        Echo:  XAM:  ECHO TRANSTHORACIC COMP W DOPP      PROCEDURE DATE:  Sep 19 2019   .      INTERPRETATION:  REPORT:    TRANSTHORACIC ECHOCARDIOGRAM REPORT         Patient Name:   MAGNO BUCHANAN Patient Location: Inpatient  [facility] Rec #:  [id number]    Accession #:      [id number]  Account #:                  Height:           65.0 in 165.0 cm  YOB: 1955   Weight:           145.1 lb 65.80 kg  Patient Age:    64 years    BSA:              1.72 m²  Patient Gender: F           BP:               116/61 mmHg       Date of Exam:        2019 3:00:39 PM  Sonographer:         Luana Frank  Referring Physician: Kelley Beal MD    Procedure:     2D Echo/Doppler/Color Doppler Complete.  Indications:   Pericardial effusion (noninflammatory) - I31.3  Diagnosis:     Pericardial effusion (noninflammatory) - I31.3  Study Details: Technically fair study.         2D AND M-MODE MEASUREMENTS (normal ranges within parentheses):  Left                 Normal   Aorta/Left            Normal  Ventricle:                    Atrium:  IVSd (2D):    0.76  (0.7-1.1) Left Atrium  3.24 cm (1.9-4.0)                 cm             (2D):  LVPWd (2D):   0.77  (0.7-1.1) LA Volume     29.5                 cm             Index         ml/m²  LVIDd (2D):   4.43  (3.4-5.7)                 cm  LVIDs (2D):   3.30                 cm  LV FS (2D):   25.7   (>25%)                  %  Relative Wall 0.35   (<0.42)  Thickness    LV SYSTOLIC FUNCTION BY 2D PLANIMETRY (MOD):  EF-A4C View: 69.6 % EF-A2C View: 63.9 % EF-Biplane: 66 %    LV DIASTOLIC FUNCTION:  MV Peak E: 0.61 m/s e', MV Eugenia: 0.08 m/s  MV Peak A: 0.87 m/s E/e' Ratio: 7.99  E/A Ratio: 0.70    SPECTRAL DOPPLER ANALYSIS (where applicable):  Aortic Valve: AoV Max Edward: 1.27 m/s AoV Peak P.4 mmHg AoV Mean PG:   3.5 mmHg    LVOT Vmax: 0.87 m/s LVOT VTI: 0.182 m LVOT Diameter: 2.01 cm    AoV Area, Vmax: 2.18 cm² AoV Area, VTI: 2.23 cm² AoV Area, Vmn: 1.99 cm²  Ao VTI: 0.260     PHYSICIAN INTERPRETATION:  Left Ventricle: The left ventricular internal cavity size is mildly   increased. Left ventricular wall thickness is normal.  Global LV systolic function was normal. Left ventricular ejection   fraction, by visual estimation, is 60 to 65%. Spectral Doppler shows   impaired relaxation pattern of left ventricular myocardial filling (Grade   I diastolic dysfunction).  Right Ventricle: Normal right ventricular size and function.  Left Atrium: The left atrium is normal in size.  Right Atrium: The right atrium is normal in size.  Pericardium: A small pericardial effusion is present. The pericardial   effusion is globally located around the entire heart. There is   respiratory variation up to 25% in the mitral valve spectral Doppler   velocities. However, there is no interventricular dependence or diastolic   collapse of the right sided chambers. The IVC is also normal sized, with   respiratory variation >50%. Overall, not consistent with cardiac   tamponade.  Mitral Valve: Structurally normal mitral valve, with normal leaflet   excursion. Trace mitral valve regurgitation is seen.  Tricuspid Valve: Structurally normal tricuspid valve, with normal leaflet   excursion. Trivial tricuspid regurgitation is visualized. Adequate TR   velocity was not obtained to accurately assess RVSP.  Aortic Valve: The aortic valve is trileaflet. No evidence of aortic valve   regurgitation is seen.  Pulmonic Valve: Structurally normal pulmonic valve, with normal leaflet   excursion. Trace pulmonic valve regurgitation.  Aorta: The aortic root is normal in size and structure.  Pulmonary Artery: The main pulmonary artery is normal in size.  Venous: A normal flow pattern is recorded from the right upper pulmonary   vein. The inferior vena cava is normal. The inferior vena cava was normal   sized, with respiratory size variation greater than 50%. The inferior   vena cava and the hepatic vein show a normal flow pattern.  In comparison to the previous echocardiogram(s): There are no prior   studies on this patient for comparison purposes.       Summary:   1. Technically fair study.   2. Left ventricular ejection fraction, by visual estimation, is 60 to   65%.   3. Normal global left ventricular systolic function.   4. Normal right ventricular size and function.   5. Spectral Doppler shows impaired relaxation pattern of left   ventricular myocardial filling (Grade I diastolic dysfunction).   6. No significant vavular abnormalities.   7. There is a small pericardial effusion located around the entire heart   without evidence of cardiac tamponade.   8. No prior studies available for comparison.    ROSELIA Narvaez. Electronically signed on 2019 at 5:55:41 PM              *** Final ***                  RUBEN SIMMONS   This document has been electronically signed. Sep 19 2019  3:00PM

## 2019-09-20 NOTE — PROGRESS NOTE BEHAVIORAL HEALTH - RISK ASSESSMENT
Moderate risk due to history of aggression but more calm x2 days
high risk aggressive behaviors
high risk aggressive behaviors
NA

## 2019-09-20 NOTE — PROGRESS NOTE BEHAVIORAL HEALTH - NSBHCONSULTMEDS_PSY_A_CORE FT
chlorpromazine    Tablet 25 milliGRAM(s) Oral two times a day  clonazepam  Tablet 1 milliGRAM(s) Oral two times a day  Risperidone Injectable, Long Acting 50 milliGRAM(s) IntraMuscular once  valproic  acid Syrup 1000 milliGRAM(s) Oral two times a day
see orders
chlorproMAZINE    Tablet 25 milliGRAM(s) Oral two times a day  clonazePAM  Tablet 1 milliGRAM(s) Oral two times a day  valproic  acid Syrup 1000 milliGRAM(s) Oral two times a day

## 2019-09-20 NOTE — DISCHARGE NOTE PROVIDER - CARE PROVIDER_API CALL
dr. clemons,   medical MD at [facility]  Phone: (   )    -  Fax: (   )    -  Follow Up Time: 1-3 days
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
## 2019-09-20 NOTE — PROGRESS NOTE ADULT - ASSESSMENT
pt was sent in from her psych facility for fever. pt. not giving any history. pt. was asked if anything bothering she said  " NO " after that she not answering any questions. As per record pt. was somewhat lethargic and less active , usually more active at baseline. Pt. was not coughing at her facility as per aide at bedside. Aide also reported that pt. has no swallowing difficulty and is on regular diet. no abd. pain. no n/v/d per history.       Pericardial Effusion  Small pericardial effusion seen on CT chest  Echo: Small pericardial effusion No tamponade. Asymptomatic. Denied CP.    Schizophrenia  As per pysch    Pneumonia   Fever  Cirrhosis   CBD dilatation

## 2019-09-20 NOTE — PROGRESS NOTE BEHAVIORAL HEALTH - NSBHCHARTREVIEWLAB_PSY_A_CORE FT
137  |  104  |  12.0  ----------------------------<  178<H>  3.7   |  21.0<L>  |  0.53    Ca    8.1<L>      14 Sep 2019 19:11    TPro  5.7<L>  /  Alb  3.0<L>  /  TBili  <0.2<L>  /  DBili  0.0  /  AST  32<H>  /  ALT  29  /  AlkPhos  67      LIVER FUNCTIONS - ( 14 Sep 2019 19:11 )  Alb: 3.0 g/dL / Pro: 5.7 g/dL / ALK PHOS: 67 U/L / ALT: 29 U/L / AST: 32 U/L / GGT: x             Urinalysis Basic - ( 14 Sep 2019 14:00 )    Color: Yellow / Appearance: Clear / S.005 / pH: x  Gluc: x / Ketone: Negative  / Bili: Negative / Urobili: Negative mg/dL   Blood: x / Protein: Negative mg/dL / Nitrite: Negative   Leuk Esterase: Small / RBC: 0-2 /HPF / WBC 6-10   Sq Epi: x / Non Sq Epi: Occasional / Bacteria: Negative
10.9   6.79  )-----------( 199      ( 17 Sep 2019 12:01 )             33.5     09-17    142  |  106  |  7.0<L>  ----------------------------<  109  3.9   |  25.0  |  0.37<L>    Ca    8.8      17 Sep 2019 12:01    TPro  5.5<L>  /  Alb  2.8<L>  /  TBili  <0.2<L>  /  DBili  x   /  AST  31  /  ALT  31  /  AlkPhos  55  09-17    LIVER FUNCTIONS - ( 17 Sep 2019 12:01 )  Alb: 2.8 g/dL / Pro: 5.5 g/dL / ALK PHOS: 55 U/L / ALT: 31 U/L / AST: 31 U/L / GGT: x
10.6   7.52  )-----------( 406      ( 20 Sep 2019 07:28 )             33.1     09-20    139  |  103  |  9.0  ----------------------------<  125<H>  3.8   |  24.0  |  0.41<L>    Ca    8.5<L>      20 Sep 2019 07:28    TPro  6.0<L>  /  Alb  2.9<L>  /  TBili  <0.2<L>  /  DBili  x   /  AST  47<H>  /  ALT  65<H>  /  AlkPhos  65  09-19    LIVER FUNCTIONS - ( 19 Sep 2019 12:48 )  Alb: 2.9 g/dL / Pro: 6.0 g/dL / ALK PHOS: 65 U/L / ALT: 65 U/L / AST: 47 U/L / GGT: x

## 2019-09-20 NOTE — DISCHARGE NOTE PROVIDER - PROVIDER TOKENS
FREE:[LAST:[dr. clemons],PHONE:[(   )    -],FAX:[(   )    -],ADDRESS:[medical MD at [facility]],FOLLOWUP:[1-3 days]]

## 2019-09-22 LAB
CULTURE RESULTS: SIGNIFICANT CHANGE UP
CULTURE RESULTS: SIGNIFICANT CHANGE UP
SPECIMEN SOURCE: SIGNIFICANT CHANGE UP
SPECIMEN SOURCE: SIGNIFICANT CHANGE UP

## 2019-09-27 RX ORDER — RISPERIDONE 4 MG/1
50 TABLET ORAL
Qty: 0 | Refills: 0 | DISCHARGE
Start: 2019-09-27

## 2019-10-10 NOTE — CDI QUERY NOTE - NSCDIOTHERTXTBX_GEN_ALL_CORE_HH
SUPPORTING DOCUMENTATION / EVIDENCE:  Sent from [facility] for fever and not eating, found to have aspiration pna      ED:  · Chief Complaint: The patient is a 64y Female complaining of fever.  · HPI Objective Statement: Pt sent from [facility] for somnolence; didn't eat today;  per staff with patient pt was okay yesterday;       VITALS ED:  T    98.2 - 105.5  HR  91 - 136      CHART NOTE PA 9/13:  Notified by nursing that temp 105.5 rectal, P130.    Blood Gas Venous - Lactate (09.12.19 @ 16:35)  3.7  Blood Gas Venous - Lactate (09.12.19 @ 23:00) 1.4  Blood Gas Venous - Lactate (09.13.19 @ 23:38) 3.0    A/P  Community acquired PNA CT showed RLL  - now w T 105.5 rectal, Lactate 3.0  - reviewed w Dr Jane  - fluid bolus 2000ml NS  - repeat lactate 3h  - will add vanco to Zosyn, Zithro to cover MRSA        PROCAL:  9/13   0.20  9/14   0.44        Is there a diagnosis associated with pna and above vital signs for chart note 9/13?  If clinically significant, please document in addendum to dc summary and clarify if evolving or present on admission.        Thank you

## 2019-11-25 ENCOUNTER — OUTPATIENT (OUTPATIENT)
Dept: OUTPATIENT SERVICES | Facility: HOSPITAL | Age: 64
LOS: 1 days | End: 2019-11-25
Payer: COMMERCIAL

## 2019-11-25 DIAGNOSIS — R91.1 SOLITARY PULMONARY NODULE: ICD-10-CM

## 2019-11-25 PROCEDURE — 71250 CT THORAX DX C-: CPT

## 2019-11-25 PROCEDURE — 71250 CT THORAX DX C-: CPT | Mod: 26

## 2019-12-20 ENCOUNTER — OUTPATIENT (OUTPATIENT)
Dept: OUTPATIENT SERVICES | Facility: HOSPITAL | Age: 64
LOS: 1 days | End: 2019-12-20

## 2019-12-20 DIAGNOSIS — E04.1 NONTOXIC SINGLE THYROID NODULE: ICD-10-CM

## 2020-03-27 ENCOUNTER — EMERGENCY (EMERGENCY)
Facility: HOSPITAL | Age: 65
LOS: 1 days | Discharge: HOME | End: 2020-03-27
Attending: EMERGENCY MEDICINE
Payer: MEDICARE

## 2020-03-27 VITALS
OXYGEN SATURATION: 94 % | RESPIRATION RATE: 16 BRPM | SYSTOLIC BLOOD PRESSURE: 136 MMHG | HEART RATE: 105 BPM | WEIGHT: 160.06 LBS | DIASTOLIC BLOOD PRESSURE: 66 MMHG | TEMPERATURE: 100 F | HEIGHT: 65 IN

## 2020-03-27 VITALS
RESPIRATION RATE: 17 BRPM | DIASTOLIC BLOOD PRESSURE: 59 MMHG | TEMPERATURE: 103 F | OXYGEN SATURATION: 97 % | SYSTOLIC BLOOD PRESSURE: 117 MMHG | HEART RATE: 114 BPM

## 2020-03-27 LAB
ALBUMIN SERPL ELPH-MCNC: 3.6 G/DL — SIGNIFICANT CHANGE UP (ref 3.3–5.2)
ALP SERPL-CCNC: 76 U/L — SIGNIFICANT CHANGE UP (ref 40–120)
ALT FLD-CCNC: 48 U/L — HIGH
ANION GAP SERPL CALC-SCNC: 14 MMOL/L — SIGNIFICANT CHANGE UP (ref 5–17)
APPEARANCE UR: CLEAR — SIGNIFICANT CHANGE UP
APTT BLD: 31.9 SEC — SIGNIFICANT CHANGE UP (ref 27.5–36.3)
AST SERPL-CCNC: 58 U/L — HIGH
BASOPHILS # BLD AUTO: 0 K/UL — SIGNIFICANT CHANGE UP (ref 0–0.2)
BASOPHILS NFR BLD AUTO: 0 % — SIGNIFICANT CHANGE UP (ref 0–2)
BILIRUB SERPL-MCNC: <0.2 MG/DL — LOW (ref 0.4–2)
BILIRUB UR-MCNC: NEGATIVE — SIGNIFICANT CHANGE UP
BUN SERPL-MCNC: 19 MG/DL — SIGNIFICANT CHANGE UP (ref 8–20)
CALCIUM SERPL-MCNC: 9.4 MG/DL — SIGNIFICANT CHANGE UP (ref 8.6–10.2)
CHLORIDE SERPL-SCNC: 101 MMOL/L — SIGNIFICANT CHANGE UP (ref 98–107)
CO2 SERPL-SCNC: 25 MMOL/L — SIGNIFICANT CHANGE UP (ref 22–29)
COLOR SPEC: YELLOW — SIGNIFICANT CHANGE UP
CREAT SERPL-MCNC: 0.55 MG/DL — SIGNIFICANT CHANGE UP (ref 0.5–1.3)
DIFF PNL FLD: NEGATIVE — SIGNIFICANT CHANGE UP
EOSINOPHIL # BLD AUTO: 0 K/UL — SIGNIFICANT CHANGE UP (ref 0–0.5)
EOSINOPHIL NFR BLD AUTO: 0 % — SIGNIFICANT CHANGE UP (ref 0–6)
GIANT PLATELETS BLD QL SMEAR: PRESENT — SIGNIFICANT CHANGE UP
GLUCOSE SERPL-MCNC: 112 MG/DL — HIGH (ref 70–99)
GLUCOSE UR QL: NEGATIVE MG/DL — SIGNIFICANT CHANGE UP
HCT VFR BLD CALC: 40.3 % — SIGNIFICANT CHANGE UP (ref 34.5–45)
HGB BLD-MCNC: 13.4 G/DL — SIGNIFICANT CHANGE UP (ref 11.5–15.5)
INR BLD: 1.01 RATIO — SIGNIFICANT CHANGE UP (ref 0.88–1.16)
KETONES UR-MCNC: ABNORMAL
LACTATE BLDV-MCNC: 1.8 MMOL/L — SIGNIFICANT CHANGE UP (ref 0.5–2)
LEUKOCYTE ESTERASE UR-ACNC: NEGATIVE — SIGNIFICANT CHANGE UP
LYMPHOCYTES # BLD AUTO: 0.61 K/UL — LOW (ref 1–3.3)
LYMPHOCYTES # BLD AUTO: 10.4 % — LOW (ref 13–44)
MANUAL SMEAR VERIFICATION: SIGNIFICANT CHANGE UP
MCHC RBC-ENTMCNC: 32.9 PG — SIGNIFICANT CHANGE UP (ref 27–34)
MCHC RBC-ENTMCNC: 33.3 GM/DL — SIGNIFICANT CHANGE UP (ref 32–36)
MCV RBC AUTO: 99 FL — SIGNIFICANT CHANGE UP (ref 80–100)
MONOCYTES # BLD AUTO: 0.61 K/UL — SIGNIFICANT CHANGE UP (ref 0–0.9)
MONOCYTES NFR BLD AUTO: 10.4 % — SIGNIFICANT CHANGE UP (ref 2–14)
MYELOCYTES NFR BLD: 0.9 % — HIGH (ref 0–0)
NEUTROPHILS # BLD AUTO: 4.54 K/UL — SIGNIFICANT CHANGE UP (ref 1.8–7.4)
NEUTROPHILS NFR BLD AUTO: 73.9 % — SIGNIFICANT CHANGE UP (ref 43–77)
NEUTS BAND # BLD: 3.5 % — SIGNIFICANT CHANGE UP (ref 0–8)
NITRITE UR-MCNC: NEGATIVE — SIGNIFICANT CHANGE UP
PH UR: 8 — SIGNIFICANT CHANGE UP (ref 5–8)
PLAT MORPH BLD: NORMAL — SIGNIFICANT CHANGE UP
PLATELET # BLD AUTO: 198 K/UL — SIGNIFICANT CHANGE UP (ref 150–400)
POTASSIUM SERPL-MCNC: 4.5 MMOL/L — SIGNIFICANT CHANGE UP (ref 3.5–5.3)
POTASSIUM SERPL-SCNC: 4.5 MMOL/L — SIGNIFICANT CHANGE UP (ref 3.5–5.3)
PROT SERPL-MCNC: 6.5 G/DL — LOW (ref 6.6–8.7)
PROT UR-MCNC: NEGATIVE MG/DL — SIGNIFICANT CHANGE UP
PROTHROM AB SERPL-ACNC: 11.4 SEC — SIGNIFICANT CHANGE UP (ref 10–12.9)
RBC # BLD: 4.07 M/UL — SIGNIFICANT CHANGE UP (ref 3.8–5.2)
RBC # FLD: 13.1 % — SIGNIFICANT CHANGE UP (ref 10.3–14.5)
RBC BLD AUTO: NORMAL — SIGNIFICANT CHANGE UP
SODIUM SERPL-SCNC: 140 MMOL/L — SIGNIFICANT CHANGE UP (ref 135–145)
SP GR SPEC: 1.01 — SIGNIFICANT CHANGE UP (ref 1.01–1.02)
UROBILINOGEN FLD QL: NEGATIVE MG/DL — SIGNIFICANT CHANGE UP
VARIANT LYMPHS # BLD: 0.9 % — SIGNIFICANT CHANGE UP (ref 0–6)
WBC # BLD: 5.86 K/UL — SIGNIFICANT CHANGE UP (ref 3.8–10.5)
WBC # FLD AUTO: 5.86 K/UL — SIGNIFICANT CHANGE UP (ref 3.8–10.5)

## 2020-03-27 PROCEDURE — 99284 EMERGENCY DEPT VISIT MOD MDM: CPT | Mod: 25

## 2020-03-27 PROCEDURE — 96372 THER/PROPH/DIAG INJ SC/IM: CPT | Mod: XU

## 2020-03-27 PROCEDURE — 93005 ELECTROCARDIOGRAM TRACING: CPT

## 2020-03-27 PROCEDURE — 83605 ASSAY OF LACTIC ACID: CPT

## 2020-03-27 PROCEDURE — 80053 COMPREHEN METABOLIC PANEL: CPT

## 2020-03-27 PROCEDURE — 84443 ASSAY THYROID STIM HORMONE: CPT

## 2020-03-27 PROCEDURE — 71045 X-RAY EXAM CHEST 1 VIEW: CPT | Mod: 26

## 2020-03-27 PROCEDURE — 96367 TX/PROPH/DG ADDL SEQ IV INF: CPT

## 2020-03-27 PROCEDURE — 81003 URINALYSIS AUTO W/O SCOPE: CPT

## 2020-03-27 PROCEDURE — 87086 URINE CULTURE/COLONY COUNT: CPT

## 2020-03-27 PROCEDURE — 96375 TX/PRO/DX INJ NEW DRUG ADDON: CPT

## 2020-03-27 PROCEDURE — 87635 SARS-COV-2 COVID-19 AMP PRB: CPT

## 2020-03-27 PROCEDURE — 85027 COMPLETE CBC AUTOMATED: CPT

## 2020-03-27 PROCEDURE — 70450 CT HEAD/BRAIN W/O DYE: CPT

## 2020-03-27 PROCEDURE — 70450 CT HEAD/BRAIN W/O DYE: CPT | Mod: 26

## 2020-03-27 PROCEDURE — 84484 ASSAY OF TROPONIN QUANT: CPT

## 2020-03-27 PROCEDURE — 93010 ELECTROCARDIOGRAM REPORT: CPT

## 2020-03-27 PROCEDURE — 85730 THROMBOPLASTIN TIME PARTIAL: CPT

## 2020-03-27 PROCEDURE — 96365 THER/PROPH/DIAG IV INF INIT: CPT

## 2020-03-27 PROCEDURE — 99285 EMERGENCY DEPT VISIT HI MDM: CPT

## 2020-03-27 PROCEDURE — 85610 PROTHROMBIN TIME: CPT

## 2020-03-27 PROCEDURE — 71045 X-RAY EXAM CHEST 1 VIEW: CPT

## 2020-03-27 PROCEDURE — 36415 COLL VENOUS BLD VENIPUNCTURE: CPT

## 2020-03-27 PROCEDURE — 87040 BLOOD CULTURE FOR BACTERIA: CPT

## 2020-03-27 RX ORDER — PIPERACILLIN AND TAZOBACTAM 4; .5 G/20ML; G/20ML
3.38 INJECTION, POWDER, LYOPHILIZED, FOR SOLUTION INTRAVENOUS ONCE
Refills: 0 | Status: COMPLETED | OUTPATIENT
Start: 2020-03-27 | End: 2020-03-27

## 2020-03-27 RX ORDER — HALOPERIDOL DECANOATE 100 MG/ML
2.5 INJECTION INTRAMUSCULAR ONCE
Refills: 0 | Status: COMPLETED | OUTPATIENT
Start: 2020-03-27 | End: 2020-03-27

## 2020-03-27 RX ORDER — VANCOMYCIN HCL 1 G
1100 VIAL (EA) INTRAVENOUS ONCE
Refills: 0 | Status: DISCONTINUED | OUTPATIENT
Start: 2020-03-27 | End: 2020-03-27

## 2020-03-27 RX ORDER — VANCOMYCIN HCL 1 G
1000 VIAL (EA) INTRAVENOUS ONCE
Refills: 0 | Status: COMPLETED | OUTPATIENT
Start: 2020-03-27 | End: 2020-03-27

## 2020-03-27 RX ORDER — ACETAMINOPHEN 500 MG
975 TABLET ORAL ONCE
Refills: 0 | Status: DISCONTINUED | OUTPATIENT
Start: 2020-03-27 | End: 2020-03-27

## 2020-03-27 RX ORDER — ACETAMINOPHEN 500 MG
650 TABLET ORAL ONCE
Refills: 0 | Status: COMPLETED | OUTPATIENT
Start: 2020-03-27 | End: 2020-03-27

## 2020-03-27 RX ORDER — SODIUM CHLORIDE 9 MG/ML
1000 INJECTION INTRAMUSCULAR; INTRAVENOUS; SUBCUTANEOUS ONCE
Refills: 0 | Status: COMPLETED | OUTPATIENT
Start: 2020-03-27 | End: 2020-03-27

## 2020-03-27 RX ADMIN — SODIUM CHLORIDE 1000 MILLILITER(S): 9 INJECTION INTRAMUSCULAR; INTRAVENOUS; SUBCUTANEOUS at 14:15

## 2020-03-27 RX ADMIN — SODIUM CHLORIDE 1000 MILLILITER(S): 9 INJECTION INTRAMUSCULAR; INTRAVENOUS; SUBCUTANEOUS at 13:09

## 2020-03-27 RX ADMIN — Medication 1000 MILLIGRAM(S): at 17:00

## 2020-03-27 RX ADMIN — Medication 650 MILLIGRAM(S): at 20:45

## 2020-03-27 RX ADMIN — Medication 250 MILLIGRAM(S): at 15:58

## 2020-03-27 RX ADMIN — Medication 1 MILLIGRAM(S): at 15:36

## 2020-03-27 RX ADMIN — HALOPERIDOL DECANOATE 2.5 MILLIGRAM(S): 100 INJECTION INTRAMUSCULAR at 13:33

## 2020-03-27 RX ADMIN — PIPERACILLIN AND TAZOBACTAM 200 GRAM(S): 4; .5 INJECTION, POWDER, LYOPHILIZED, FOR SOLUTION INTRAVENOUS at 13:09

## 2020-03-27 RX ADMIN — HALOPERIDOL DECANOATE 2.5 MILLIGRAM(S): 100 INJECTION INTRAMUSCULAR at 13:00

## 2020-03-27 RX ADMIN — PIPERACILLIN AND TAZOBACTAM 3.38 GRAM(S): 4; .5 INJECTION, POWDER, LYOPHILIZED, FOR SOLUTION INTRAVENOUS at 13:39

## 2020-03-27 NOTE — ED PROVIDER NOTE - OBJECTIVE STATEMENT
64y F w/ hx schizophrenia, thyroid lesion, presenting from [facility] with altered mental status.  Per transfer papers and EMS, pt was found on the ground after an apparent unwitnessed fall this morning.  Pt was noted to have fever of 101.3 and O2 sat of 92% on RA.  She was given Tylenol 650 mg and sent to the ED to r/o COVID.  Pt denies any complaints at this time; unable to provide further information.  Per aide at bedside, pt is normally AAOx3, and seems more confused compared to her baseline.  Pt has not been on isolation at [facility]; there have been a few confirmed cases of COVID-19 there. 64y F w/ hx schizophrenia, thyroid lesion, presenting from [facility] with altered mental status.  Per transfer papers and EMS, pt was found on the ground after an apparent unwitnessed fall this morning.  Pt was noted to have fever of 101.3 and O2 sat of 92% on RA.  She was given Tylenol 650 mg and sent to the ED to r/o COVID.  Pt denies any complaints at this time; unable to provide further information.  Per aide at bedside, pt is normally AAOx3 and seems more confused compared to her baseline, although this is not completely out of the ordinary for her.  Pt has not been on isolation at [facility]; there have been a few confirmed cases of COVID-19 there. 64y F w/ hx schizophrenia, thyroid lesion, presenting from [facility] with fever and concern of altered mental status.  Per transfer papers and EMS, pt was found on the ground after a possible unwitnessed fall this morning.  Pt was noted to have fever of 101.3 and O2 sat of 92% on RA.  She was given Tylenol 650 mg and sent to the ED to r/o COVID.  Pt denies any complaints at this time; unable to provide further information.  Per aide at bedside, pt seems a little more confused compared to her baseline, although she usually does not like to talk much.  Pt has not been on isolation at [facility]; there have been a few confirmed cases of COVID-19 there.

## 2020-03-27 NOTE — ED PROVIDER NOTE - NSFOLLOWUPINSTRUCTIONS_ED_ALL_ED_FT
Give Tylenol as needed for symptoms.  Patient needs to be isolated until symptoms have completely resolved or COVID test comes back.  Stay hydrated.  Return to the emergency room for worsening symptoms, including increased difficulty breathing, chest pain, or increased confusion.

## 2020-03-27 NOTE — ED PROVIDER NOTE - CLINICAL SUMMARY MEDICAL DECISION MAKING FREE TEXT BOX
64y F w/ schizophrenia, presenting from [place] with fall and altered mental status.  Pt noted to be febrile and mildly hypoxic prior to arrival.  Will obtain CT head, CXR, EKG, labs, UA, cultures.  Tx with fluids and reassess.  Plan for admission given AMS in the setting of likely infection. 64y F w/ schizophrenia, presenting from [facility] with fall and altered mental status.  Pt noted to be febrile and mildly hypoxic prior to arrival.  Will obtain CT head, CXR, EKG, labs, UA, cultures.  Tx with fluids and reassess. 64y F w/ schizophrenia, presenting from [place] with possible fall and concern of altered mental status.  Pt noted to be febrile and mildly hypoxic prior to arrival.  Will obtain CT head, CXR, EKG, labs, UA, cultures.  Tx with fluids and reassess.

## 2020-03-27 NOTE — ED PROVIDER NOTE - PROGRESS NOTE DETAILS
Pt became agitated when obtaining blood work, requiring Haldol 2.5 mg IM x 2. Pt unable to cooperate for CT scan.  Per aide, she is acting mostly at her baseline, but seems more tired than usual.  She also notes that pt has a tendency to get out of bed and lie of the floor.  Pt noted to be ambulating without difficulty in the ED.  Will give Ativan prior to CT. Pt unable to cooperate for CT scan.  Per aide, she is acting mostly at her baseline, but seems more tired than usual.  She also notes that pt has a tendency to get out of bed and lie on the floor.  Pt noted to be ambulating without difficulty in the ED.  Will give Ativan prior to CT. Pt was moving during head CT, so limited test.  Pt refusing repeat scan.  Pt remains at baseline mental status as per aide, ambulating without difficulty.  Labs and imaging reviewed.  Stable for discharge with return precautions given for new/worsening symptoms. Pt unable to cooperate for CT scan.  Per aide, she is acting mostly at her baseline, but seems a little more tired than usual.  She also notes that pt has a tendency to get out of bed and lie on the floor, and thinks this is what happened this morning.  Pt noted to be ambulating without difficulty in the ED.  Will give Ativan prior to CT. Pt was moving during head CT, so limited test.  Pt refusing repeat scan.  Pt at baseline mental status as per aide, ambulating without difficulty.  Labs and imaging reviewed.  Stable for discharge with return precautions given for new/worsening symptoms.

## 2020-03-27 NOTE — ED ADULT NURSE NOTE - NSIMPLEMENTINTERV_GEN_ALL_ED
Implemented All Universal Safety Interventions:  [place] to call system. Call bell, personal items and telephone within reach. Instruct patient to call for assistance. Room bathroom lighting operational. Non-slip footwear when patient is off stretcher. Physically safe environment: no spills, clutter or unnecessary equipment. Stretcher in lowest position, wheels locked, appropriate side rails in place.

## 2020-03-27 NOTE — ED ADULT NURSE NOTE - OBJECTIVE STATEMENT
Pt comes from [facility] s/p being found on floor at facility, [facility] AMS, fever and hypoxia, alert and oriented to person, resp even and unlabored, [facility] aid at bedside, refer to transfer paperwork for further information.

## 2020-03-27 NOTE — ED ADULT TRIAGE NOTE - CHIEF COMPLAINT QUOTE
Pt BIBA from [place] to r/o Covid. Pt is AMS (not baseline), was febrile 101.2 and 92% on RA. Was found on floor in her room. Given Tylenol PTA.

## 2020-03-27 NOTE — ED ADULT NURSE REASSESSMENT NOTE - NS ED NURSE REASSESS COMMENT FT1
Additional dose of 2.5 mg IM haldol given as per Dr Ferguson due to pt yelling and cursing out [facility] aide/RN, pt continues to state "I don't want you to help me", re-educated on importance of treatment, pending blood work, will continue to monitor.

## 2020-03-27 NOTE — ED PROVIDER NOTE - PHYSICAL EXAMINATION
Constitutional: Awake, in no acute distress  Eyes: no scleral icterus  HENT: normocephalic, atraumatic, no scalp or neck tenderness, moist oral mucosa  CV: regular rhythm, mild tachycardia, no murmur  Pulm: diminished breath sounds b/l  Abdomen: soft, non-tender, non-distended  Back: no spinal tenderness  Extremities: pelvis stable, no edema, no deformity  Skin: no rash, no jaundice  Neuro: AAOx1, moving all extremities equally

## 2020-03-27 NOTE — ED ADULT NURSE REASSESSMENT NOTE - NS ED NURSE REASSESS COMMENT FT1
Pt admits to feeling better, able to ambulate safely and steadily w/ assistance of [facility] aide, denies dizziness/weakness upon standing, VSS, pt d/c back to [place], d/c paperwork provided w/ referral information, COVID 19 information provided.

## 2020-03-27 NOTE — ED ADULT NURSE NOTE - CHIEF COMPLAINT QUOTE
Pt BIBA from [facility] to r/o Covid. Pt is AMS (not baseline), was febrile 101.2 and 92% on RA. Was found on floor in her room. Given Tylenol PTA.

## 2020-03-27 NOTE — ED PROVIDER NOTE - PATIENT PORTAL LINK FT
You can access the FollowMyHealth Patient Portal offered by [facility] by registering at the following website: [email or web address]. By joining [employer]’s FollowMyHealth portal, you will also be able to view your health information using other applications (apps) compatible with our system.

## 2020-03-28 LAB
CULTURE RESULTS: SIGNIFICANT CHANGE UP
SARS-COV-2 RNA SPEC QL NAA+PROBE: DETECTED
SPECIMEN SOURCE: SIGNIFICANT CHANGE UP

## 2020-04-28 NOTE — ED ADULT TRIAGE NOTE - NSSEPSISSUSPECTED_ED_A_ED
Voice message, external INR result from [place] with [facility] collected 4/27, result of 1.7. Goal range should be 2.0-3.0.    Yes

## 2020-08-20 NOTE — PROGRESS NOTE ADULT - PROBLEM/PLAN-1
Presenting with acute hypoxia    Required 6 L of oxygen admission, shortly after placed on mid flow, currently down to 6 L nasal cannula  Started on COVID-19 pathway  RVP negative  Continue supplemental oxygen maintain oxygenation greater than 88% DISPLAY PLAN FREE TEXT

## 2020-12-20 NOTE — BEHAVIORAL HEALTH ASSESSMENT NOTE - NSBHTIMEBASEDBILLING_PSY_A_CORE
Presents to the ED for COVID testing.  Last COVID test performed yesterday.  Denies fever, chills, cough, SOB, chest pain, or diarrhea.  No known close contact with suspected or known COVID + persons.  Recent travel to [place].
no

## 2021-01-24 NOTE — ED ADULT NURSE NOTE - NS_ED_NURSE_TEACHING_TOPIC_ED_A_ED
I have personally performed a face to face diagnostic evaluation on this patient. I have reviewed the ACP note and agree with the history, exam and plan of care, except as noted.
Respiratory

## 2023-07-31 NOTE — BEHAVIORAL HEALTH ASSESSMENT NOTE - LANGUAGE
Patient can call the billing department for codes for xray. Please call patient and have her call billing department.   No abnormalities noted

## 2024-02-21 RX ORDER — DIVALPROEX SODIUM 500 MG/1
0 TABLET, DELAYED RELEASE ORAL
Qty: 0 | Refills: 0 | DISCHARGE

## 2024-02-21 RX ORDER — CLONAZEPAM 1 MG
1 TABLET ORAL
Qty: 0 | Refills: 0 | DISCHARGE

## 2024-02-21 RX ORDER — SIMVASTATIN 20 MG/1
1 TABLET, FILM COATED ORAL
Qty: 0 | Refills: 0 | DISCHARGE

## 2024-02-21 RX ORDER — DOCUSATE SODIUM 100 MG
1 CAPSULE ORAL
Qty: 0 | Refills: 0 | DISCHARGE

## 2024-02-21 RX ORDER — LEVOTHYROXINE SODIUM 125 MCG
1 TABLET ORAL
Qty: 0 | Refills: 0 | DISCHARGE

## 2024-11-20 NOTE — BEHAVIORAL HEALTH ASSESSMENT NOTE - COLLATERAL SOURCE
I reviewed the H&P, I examined the patient, and there are no changes in the patient's condition.    A/P: cholecystitis cholelithiasis   Plan lap jayesh  
None available